# Patient Record
Sex: MALE | Race: WHITE | Employment: OTHER | URBAN - METROPOLITAN AREA
[De-identification: names, ages, dates, MRNs, and addresses within clinical notes are randomized per-mention and may not be internally consistent; named-entity substitution may affect disease eponyms.]

---

## 2017-05-17 ENCOUNTER — APPOINTMENT (OUTPATIENT)
Dept: GENERAL RADIOLOGY | Facility: HOSPITAL | Age: 72
DRG: 189 | End: 2017-05-17
Attending: EMERGENCY MEDICINE
Payer: MEDICARE

## 2017-05-17 ENCOUNTER — HOSPITAL ENCOUNTER (INPATIENT)
Facility: HOSPITAL | Age: 72
LOS: 4 days | Discharge: HOME | DRG: 189 | End: 2017-05-21
Attending: EMERGENCY MEDICINE | Admitting: HOSPITALIST
Payer: MEDICARE

## 2017-05-17 DIAGNOSIS — J44.1 COPD EXACERBATION (HCC): Primary | ICD-10-CM

## 2017-05-17 PROCEDURE — 71010 XR CHEST AP PORTABLE  (CPT=71010): CPT | Performed by: EMERGENCY MEDICINE

## 2017-05-17 PROCEDURE — 99291 CRITICAL CARE FIRST HOUR: CPT | Performed by: INTERNAL MEDICINE

## 2017-05-17 RX ORDER — HYDRALAZINE HYDROCHLORIDE 20 MG/ML
10 INJECTION INTRAMUSCULAR; INTRAVENOUS EVERY 6 HOURS PRN
Status: DISCONTINUED | OUTPATIENT
Start: 2017-05-17 | End: 2017-05-21

## 2017-05-17 RX ORDER — ALBUTEROL SULFATE 2.5 MG/3ML
2.5 SOLUTION RESPIRATORY (INHALATION) CONTINUOUS
Status: DISCONTINUED | OUTPATIENT
Start: 2017-05-17 | End: 2017-05-21

## 2017-05-17 RX ORDER — METHYLPREDNISOLONE SODIUM SUCCINATE 125 MG/2ML
125 INJECTION, POWDER, LYOPHILIZED, FOR SOLUTION INTRAMUSCULAR; INTRAVENOUS ONCE
Status: COMPLETED | OUTPATIENT
Start: 2017-05-17 | End: 2017-05-17

## 2017-05-17 RX ORDER — ALBUTEROL SULFATE 2.5 MG/3ML
SOLUTION RESPIRATORY (INHALATION)
Status: DISCONTINUED
Start: 2017-05-17 | End: 2017-05-17

## 2017-05-17 RX ORDER — LEVOFLOXACIN 5 MG/ML
500 INJECTION, SOLUTION INTRAVENOUS EVERY 24 HOURS
Status: DISCONTINUED | OUTPATIENT
Start: 2017-05-18 | End: 2017-05-18

## 2017-05-17 RX ORDER — METHYLPREDNISOLONE SODIUM SUCCINATE 40 MG/ML
40 INJECTION, POWDER, LYOPHILIZED, FOR SOLUTION INTRAMUSCULAR; INTRAVENOUS EVERY 6 HOURS
Status: DISCONTINUED | OUTPATIENT
Start: 2017-05-18 | End: 2017-05-18

## 2017-05-17 RX ORDER — ACETYLCYSTEINE 200 MG/ML
2 SOLUTION ORAL; RESPIRATORY (INHALATION) ONCE
Status: COMPLETED | OUTPATIENT
Start: 2017-05-17 | End: 2017-05-17

## 2017-05-17 RX ORDER — LEVOFLOXACIN 5 MG/ML
500 INJECTION, SOLUTION INTRAVENOUS ONCE
Status: DISCONTINUED | OUTPATIENT
Start: 2017-05-17 | End: 2017-05-17

## 2017-05-17 RX ORDER — HEPARIN SODIUM 5000 [USP'U]/ML
5000 INJECTION, SOLUTION INTRAVENOUS; SUBCUTANEOUS EVERY 12 HOURS SCHEDULED
Status: DISCONTINUED | OUTPATIENT
Start: 2017-05-17 | End: 2017-05-21

## 2017-05-17 RX ORDER — SODIUM CHLORIDE 9 MG/ML
INJECTION, SOLUTION INTRAVENOUS CONTINUOUS
Status: DISCONTINUED | OUTPATIENT
Start: 2017-05-17 | End: 2017-05-21

## 2017-05-17 RX ORDER — IPRATROPIUM BROMIDE AND ALBUTEROL SULFATE 2.5; .5 MG/3ML; MG/3ML
3 SOLUTION RESPIRATORY (INHALATION) ONCE
Status: COMPLETED | OUTPATIENT
Start: 2017-05-17 | End: 2017-05-17

## 2017-05-17 RX ORDER — ALBUTEROL SULFATE 90 UG/1
2 AEROSOL, METERED RESPIRATORY (INHALATION) EVERY 6 HOURS PRN
COMMUNITY

## 2017-05-17 RX ORDER — IPRATROPIUM BROMIDE AND ALBUTEROL SULFATE 2.5; .5 MG/3ML; MG/3ML
3 SOLUTION RESPIRATORY (INHALATION)
Status: DISCONTINUED | OUTPATIENT
Start: 2017-05-18 | End: 2017-05-18

## 2017-05-18 ENCOUNTER — APPOINTMENT (OUTPATIENT)
Dept: ULTRASOUND IMAGING | Facility: HOSPITAL | Age: 72
DRG: 189 | End: 2017-05-18
Attending: HOSPITALIST
Payer: MEDICARE

## 2017-05-18 PROCEDURE — 99233 SBSQ HOSP IP/OBS HIGH 50: CPT | Performed by: INTERNAL MEDICINE

## 2017-05-18 PROCEDURE — 76705 ECHO EXAM OF ABDOMEN: CPT | Performed by: HOSPITALIST

## 2017-05-18 PROCEDURE — 99233 SBSQ HOSP IP/OBS HIGH 50: CPT | Performed by: HOSPITALIST

## 2017-05-18 RX ORDER — PANTOPRAZOLE SODIUM 40 MG/1
40 TABLET, DELAYED RELEASE ORAL
Status: DISCONTINUED | OUTPATIENT
Start: 2017-05-18 | End: 2017-05-21

## 2017-05-18 RX ORDER — LORAZEPAM 2 MG/ML
1 INJECTION INTRAMUSCULAR
Status: DISCONTINUED | OUTPATIENT
Start: 2017-05-18 | End: 2017-05-19

## 2017-05-18 RX ORDER — IPRATROPIUM BROMIDE AND ALBUTEROL SULFATE 2.5; .5 MG/3ML; MG/3ML
3 SOLUTION RESPIRATORY (INHALATION) EVERY 4 HOURS
Status: DISCONTINUED | OUTPATIENT
Start: 2017-05-18 | End: 2017-05-21

## 2017-05-18 RX ORDER — LEVOFLOXACIN 5 MG/ML
750 INJECTION, SOLUTION INTRAVENOUS EVERY 24 HOURS
Status: DISCONTINUED | OUTPATIENT
Start: 2017-05-18 | End: 2017-05-21

## 2017-05-18 RX ORDER — LORAZEPAM 2 MG/ML
INJECTION INTRAMUSCULAR
Status: COMPLETED
Start: 2017-05-18 | End: 2017-05-18

## 2017-05-18 RX ORDER — LORAZEPAM 2 MG/1
2 TABLET ORAL
Status: DISCONTINUED | OUTPATIENT
Start: 2017-05-18 | End: 2017-05-19

## 2017-05-18 RX ORDER — IPRATROPIUM BROMIDE AND ALBUTEROL SULFATE 2.5; .5 MG/3ML; MG/3ML
3 SOLUTION RESPIRATORY (INHALATION) EVERY 4 HOURS PRN
Status: DISCONTINUED | OUTPATIENT
Start: 2017-05-18 | End: 2017-05-21

## 2017-05-18 RX ORDER — ACETYLCYSTEINE 200 MG/ML
2 SOLUTION ORAL; RESPIRATORY (INHALATION) EVERY 4 HOURS
Status: DISCONTINUED | OUTPATIENT
Start: 2017-05-18 | End: 2017-05-20

## 2017-05-18 RX ORDER — FUROSEMIDE 10 MG/ML
40 INJECTION INTRAMUSCULAR; INTRAVENOUS ONCE
Status: COMPLETED | OUTPATIENT
Start: 2017-05-18 | End: 2017-05-18

## 2017-05-18 RX ORDER — LORAZEPAM 1 MG/1
1 TABLET ORAL
Status: DISCONTINUED | OUTPATIENT
Start: 2017-05-18 | End: 2017-05-19

## 2017-05-18 RX ORDER — NITROGLYCERIN 0.4 MG/1
0.4 TABLET SUBLINGUAL EVERY 5 MIN PRN
Status: DISCONTINUED | OUTPATIENT
Start: 2017-05-18 | End: 2017-05-21

## 2017-05-18 RX ORDER — LOSARTAN POTASSIUM 25 MG/1
12.5 TABLET ORAL DAILY
Status: DISCONTINUED | OUTPATIENT
Start: 2017-05-18 | End: 2017-05-21

## 2017-05-18 RX ORDER — LOSARTAN POTASSIUM 25 MG/1
12.5 TABLET ORAL DAILY
COMMUNITY
End: 2017-05-26

## 2017-05-18 RX ORDER — SODIUM CHLORIDE 9 MG/ML
INJECTION, SOLUTION INTRAVENOUS CONTINUOUS
Status: DISCONTINUED | OUTPATIENT
Start: 2017-05-18 | End: 2017-05-18

## 2017-05-18 RX ORDER — LEVOFLOXACIN 5 MG/ML
750 INJECTION, SOLUTION INTRAVENOUS EVERY 24 HOURS
Status: DISCONTINUED | OUTPATIENT
Start: 2017-05-19 | End: 2017-05-18

## 2017-05-18 RX ORDER — LORAZEPAM 2 MG/ML
0.5 INJECTION INTRAMUSCULAR ONCE
Status: COMPLETED | OUTPATIENT
Start: 2017-05-18 | End: 2017-05-18

## 2017-05-18 RX ORDER — LORAZEPAM 2 MG/ML
2 INJECTION INTRAMUSCULAR
Status: DISCONTINUED | OUTPATIENT
Start: 2017-05-18 | End: 2017-05-19

## 2017-05-18 RX ORDER — HEPARIN SODIUM 5000 [USP'U]/ML
5000 INJECTION, SOLUTION INTRAVENOUS; SUBCUTANEOUS EVERY 12 HOURS SCHEDULED
Status: DISCONTINUED | OUTPATIENT
Start: 2017-05-18 | End: 2017-05-18

## 2017-05-18 RX ORDER — METHYLPREDNISOLONE SODIUM SUCCINATE 40 MG/ML
60 INJECTION, POWDER, LYOPHILIZED, FOR SOLUTION INTRAMUSCULAR; INTRAVENOUS EVERY 8 HOURS
Status: DISCONTINUED | OUTPATIENT
Start: 2017-05-18 | End: 2017-05-20

## 2017-05-18 NOTE — BH PROGRESS NOTE
ADMISSION NOTE    71year old male with O2 Dependent COPD  presents with acute respiratory failure . Available medical records partially reviewed. Dictation to follow.     Osito Boykin M.D.  5/18/2017

## 2017-05-18 NOTE — PROGRESS NOTES
Post midnight follow up     Acute on chronic COPD exacerbation with acute on chronic respiratory failure, likely secondary to viral infection    - continue nebulizers q.4 h. with Mucomyst.     - can't tolerate bipap- oxygen prn  - patient at baseline wears

## 2017-05-18 NOTE — PLAN OF CARE
Problem: Patient/Family Goals  Goal: Patient/Family Long Term Goal  Patient’s Long Term Goal: to go home    Interventions:  -   - See additional Care Plan goals for specific interventions   Outcome: Progressing  Goal: Patient/Family Short Term Goal  Tri cannula. No changes in mentation or behavior. Continue to monitor.      Problem: METABOLIC/FLUID AND ELECTROLYTES - ADULT  Goal: Electrolytes maintained within normal limits  INTERVENTIONS:  - Monitor labs and rhythm and assess patient for signs and symptom

## 2017-05-18 NOTE — CM/SW NOTE
CTL update for progression of care. Patient is a 71year old  male who lives with his wife in [place]. Patient was admitted with exacerbation of COPD, SOB, cough, HTN and ETOH. PT & OT evals ordered for discharge recommendations.   PCP: Dr. Leydi Hicks

## 2017-05-18 NOTE — PROGRESS NOTES
[facility]    Progress Note      Assessment and Plan:   1.  Acute on chronic respiratory failure–the patient has a severe COPD exacerbation with yellow phlegm, dyspnea, chest congestion but with a clear chest x-ray demonstrating only hyperi breath sounds to auscultation and percussion. Cardiac regular rate and rhythm no murmur. Abdomen nontender, without hepatosplenomegaly and no mass appreciable. Extremities without clubbing cyanosis nor edema.    Neurologic grossly intact with symmetric

## 2017-05-18 NOTE — H&P
[facility]    PATIENT'S NAME: Jareth Susanameghna PHYSICIAN: Madhu Mike MD   PATIENT ACCOUNT#:   [id number]    LOCATION:  [address] [place] [address] RECORD #:   [id number]       YOB: 1945  ADMISSION DATE:       05 appetite has been very poor during these past 4 or 5 days and that he has been drinking only water. The only water he drinks is distilled water, and reports that he can drink about a gallon or so in a 24-hour time period.      PAST MEDICAL HISTORY:  COPD a Markedly decreased breath sounds bilaterally with prolonged expiratory phases and wheezes. Some scattered rhonchi as well. He did have some respiratory distress with sitting upright. He said could not lay down in bed, but again could not tolerate BiPAP. basis of drinking a fairly large amount of distilled water, which of course contains no minerals, and not eating. The patient is not on a diuretic as far as I can tell.   I suspect he simply washed out his concentration gradient with his consumption of dis

## 2017-05-18 NOTE — PROGRESS NOTES
[facility]    Progress Note    Arpita Moralez Patient Status:  Inpatient    1945 MRN [id number]   Location [facility] 2W/SW Attending Emma Wagner MD   Hosp Day # 1 PCP Jac Barnes MD     Subjective:     Constitution hyponatremia. Most likely d/t heavy alcohol intake  - sodium this am 117-->119  - renal following  - fluid restriction  - continue IVF and lasix per renal    Hypertension.    - Continue home medications. Sore throat.    - Strep screen was negative.   - l

## 2017-05-18 NOTE — PROGRESS NOTES
PT RECEIVED FROM ED TO ROOM 201 AT THIS TIME FOR SEVERE COPD EXACERBATION. REMAINS IN RESPIRATORY DISTRESS UPON ARRIVAL -- RR 30s W/ ACCESSORY MUSCLE USE, SPO2 >96% ON 6LNC.  RESPIRATORY THERAPIST NOTIFIED FOR INITIATION OF DUONEB TREATMENT PER PT REQUEST (

## 2017-05-18 NOTE — ED INITIAL ASSESSMENT (HPI)
Pt here for SOB. Pt was seen yesterday by doctor to r/o pneumonia. Pt states SOB has been increasing since then. Pt on 4lpm via NC at home. Pt tachypneic, with retractions, labored breathing.

## 2017-05-18 NOTE — CONSULTS
[facility]    Report of Consultation    Mi Page Patient Status:  Inpatient    1945 MRN [id number]   Location [facility] 2W/SW Attending Mundo Sanchez MD   Hosp Day # 1 PCP Gilbert Craft MD     Date of Admission:  5 Q1H PRN   Or      LORazepam (ATIVAN) tab 2 mg 2 mg Oral Q1H PRN   Or      LORazepam (ATIVAN) injection 2 mg 2 mg Intravenous Q1H PRN   INFUVITE (INFUVITE) 10 mL, Thiamine HCl 162 mg, folic acid (FOLVITE) 1 mg in dextrose 5 % 1,000 mL infusion  Intravenous 13.6  13.2   WBC  11.7*  8.3   PLT  164  165         Recent Labs   Lab  05/17/17   1945  05/18/17   0628   GLU  110*  165*   BUN  5*  10   CREATSERUM  0.84  0.90   GFRAA  >60  >60   GFRNAA  >60  >60   CA  8.8  8.8   NA  120*  117*   K  4.3  4.3   CL  83*

## 2017-05-18 NOTE — ED PROVIDER NOTES
Patient Seen in: [facility] Emergency Department    History   Patient presents with:  Dyspnea LEIGHANN SOB (respiratory)    Stated Complaint: SOB since Mnday, on oxygen at home    The history is provided by the patient and a relative.        71year old well-nourished. He is cooperative. Non-toxic appearance. He appears ill. He appears distressed. The patient is in respiratory distress, he is working very hard to breathe sitting forward in a tripod position.    HENT:   Head: Normocephalic and atraumatic Abnormality         Status                     ---------                               -----------         ------                     CBC W/ DIFFERENTIAL[[id number]]          Abnormal            Final result                 Please view results for these pedro albuterol Sulfate (VENTOLIN) (5 MG/ML) 0.5% nebulizer solution 10 mg ( Nebulization Canceled Entry 5/17/17 2118)   albuterol sulfate (VENTOLIN) (2.5 MG/3ML) 0.083% nebulizer solution 2.5 mg (2.5 mg Nebulization New Cont Neb 5/17/17 9389)   levofloxacin i

## 2017-05-18 NOTE — DISCHARGE PLANNING
SANCHEZ following up on d/c planning for the patient. SANCHEZ met with the patient's family at bedside. He lives with his wife and has been independent with his care. He has no assistive devices and has no h/o rehab or HHC. He has 5 stairs at home as well.   PT/O

## 2017-05-18 NOTE — CONSULTS
[facility] HOSP - [facility]    Consult Note    Date:  5/17/2017  Date of Admission:  5/17/2017      Chief Complaint:   Arpita Moralez is a(n) 71year old male with dyspnea.     HPI:   The patient has long-standing history of COPD having smoked 1 pack per da and accommodation. Neck without adenopathy, thyromegaly, JVD nor bruit. Lungs markedly diminished breath sounds to auscultation. Cardiac regular rate and rhythm no murmur. Abdomen nontender, without hepatosplenomegaly and no mass appreciable.    Extr with the names of the medications which he is taking. In the meantime, will use hydralazine IV. I am delighted to assist with Chandana's care.       > 35 min crit care time    Leanna Millan MD  Medical Director, Critical Care, [address] [place] [address]  Medical Director, [place]

## 2017-05-18 NOTE — PROGRESS NOTES
PT INCREASINGLY ANXIOUS/TREMULOUS. PT ADMITS TO DRINKING DAILY - \"COUPLE SHOTS OF WHISKEY, FEW BEERS, AND SOME WINE  DAILY TO TAKE THE EDGE OFF\" - REPORTS LAST DRINK WAS AT ABOUT NOON ON 5/17/17. MD NOTIFIED. TIMBO PROTOCOL INITIATED.

## 2017-05-19 ENCOUNTER — APPOINTMENT (OUTPATIENT)
Dept: GENERAL RADIOLOGY | Facility: HOSPITAL | Age: 72
DRG: 189 | End: 2017-05-19
Attending: INTERNAL MEDICINE
Payer: MEDICARE

## 2017-05-19 PROCEDURE — 99233 SBSQ HOSP IP/OBS HIGH 50: CPT | Performed by: HOSPITALIST

## 2017-05-19 PROCEDURE — 71010 XR CHEST AP PORTABLE  (CPT=71010): CPT | Performed by: INTERNAL MEDICINE

## 2017-05-19 PROCEDURE — 99233 SBSQ HOSP IP/OBS HIGH 50: CPT | Performed by: INTERNAL MEDICINE

## 2017-05-19 PROCEDURE — 90792 PSYCH DIAG EVAL W/MED SRVCS: CPT | Performed by: OTHER

## 2017-05-19 RX ORDER — FUROSEMIDE 10 MG/ML
20 INJECTION INTRAMUSCULAR; INTRAVENOUS ONCE
Status: COMPLETED | OUTPATIENT
Start: 2017-05-19 | End: 2017-05-19

## 2017-05-19 RX ORDER — 0.9 % SODIUM CHLORIDE 0.9 %
VIAL (ML) INJECTION
Status: COMPLETED
Start: 2017-05-19 | End: 2017-05-19

## 2017-05-19 RX ORDER — LORAZEPAM 1 MG/1
1 TABLET ORAL EVERY 2 HOUR PRN
Status: DISCONTINUED | OUTPATIENT
Start: 2017-05-19 | End: 2017-05-20

## 2017-05-19 RX ORDER — MELATONIN
100 DAILY
Status: DISCONTINUED | OUTPATIENT
Start: 2017-05-19 | End: 2017-05-21

## 2017-05-19 RX ORDER — POTASSIUM CHLORIDE 20 MEQ/1
40 TABLET, EXTENDED RELEASE ORAL ONCE
Status: COMPLETED | OUTPATIENT
Start: 2017-05-19 | End: 2017-05-19

## 2017-05-19 RX ORDER — ESCITALOPRAM OXALATE 10 MG/1
5 TABLET ORAL NIGHTLY
Status: DISCONTINUED | OUTPATIENT
Start: 2017-05-19 | End: 2017-05-21

## 2017-05-19 RX ORDER — LORAZEPAM 2 MG/ML
2 INJECTION INTRAMUSCULAR EVERY 2 HOUR PRN
Status: DISCONTINUED | OUTPATIENT
Start: 2017-05-19 | End: 2017-05-20

## 2017-05-19 RX ORDER — FOLIC ACID 1 MG/1
1 TABLET ORAL DAILY
Status: DISCONTINUED | OUTPATIENT
Start: 2017-05-19 | End: 2017-05-21

## 2017-05-19 RX ORDER — CHLORDIAZEPOXIDE HYDROCHLORIDE 5 MG/1
5 CAPSULE, GELATIN COATED ORAL 3 TIMES DAILY
Status: DISCONTINUED | OUTPATIENT
Start: 2017-05-19 | End: 2017-05-20

## 2017-05-19 RX ORDER — LORAZEPAM 2 MG/1
2 TABLET ORAL EVERY 2 HOUR PRN
Status: DISCONTINUED | OUTPATIENT
Start: 2017-05-19 | End: 2017-05-20

## 2017-05-19 RX ORDER — LORAZEPAM 2 MG/ML
1 INJECTION INTRAMUSCULAR EVERY 2 HOUR PRN
Status: DISCONTINUED | OUTPATIENT
Start: 2017-05-19 | End: 2017-05-20

## 2017-05-19 NOTE — PHYSICAL THERAPY NOTE
PHYSICAL THERAPY EVALUATION - INPATIENT     Room Number: 201/201-A  Evaluation Date: 5/19/2017  Type of Evaluation: Initial  Physician Order: PT Eval and Treat    Presenting Problem: COPD exacerbation  Reason for Therapy: Mobility Dysfunction and Disch hypertension    • Hepatitis      HEPATITIS C       Past Surgical History  History reviewed. No pertinent past surgical history.     HOME SITUATION  Type of Home: House   Home Layout: One level  Stairs to Enter : 5  Railing: Yes  Stairs to Bedroom: 0       L blankets)?: A Little   -   Sitting down on and standing up from a chair with arms (e.g., wheelchair, bedside commode, etc.): A Lot   -   Moving from lying on back to sitting on the side of the bed?: A Little   How much help from another person does the pat provided to patient in preparation for discharge.    Goal #5   Current Status    Goal #6    Goal #6  Current Status

## 2017-05-19 NOTE — DISCHARGE PLANNING
SW met with the patient and family at bedside re recommendations for rehab. Family is hopeful that the patient can return home and not go to rehab. SNF list left with them.     Venita Joiner [facility]  [id number]

## 2017-05-19 NOTE — DIETARY NOTE
ADULT NUTRITION INITIAL ASSESSMENT    Pt is at moderate nutrition risk. Pt does not meet malnutrition criteria.       RECOMMENDATIONS TO MD: See Nutrition Intervention      NUTRITION DIAGNOSIS/PROBLEM:  Inadequate oral intake related to decreased ability t intact    FOOD/NUTRITION RELATED HISTORY:  Appetite: Fair and Improved  Intake: 50% ( pt and family reports appetite picking up, po intake better now compared to po intake at home).    Intake Meeting Needs: No   Food Allergies: NKFA  Cultural/Ethnic/Religio

## 2017-05-19 NOTE — PROGRESS NOTES
[facility]    Progress Note    Meg Orf Patient Status:  Inpatient    1945 MRN [id number]   Location [facility] 2W/SW Attending Yelena Gaspar MD   Hosp Day # 2 PCP Aditya Vang MD     Subjective:     Edilo this am 117-->119-->121--> 126  - renal following  - fluid restriction  - continue IVF and lasix per renal    Hypertension.    - Continue home medications. Sore throat.    - Strep screen was negative.   - lozenges ordered    Alcohol dependence with withd at 12:11 by Uvaldo Dangelo, APDIONNA  5/19/2017

## 2017-05-19 NOTE — PROGRESS NOTES
[place] [facility] - [facility]    Progress Note    Libra Abarca Patient Status:  Inpatient    1945 MRN [id number]   Location [facility] 2W/SW Attending Natanael Osborne MD   Hosp Day # 2 PCP Ishmael Ramos MD     Subjective:     Constitution 05/19/2017   CREATSERUM 0.89 05/19/2017   BUN 14 05/19/2017   * 05/19/2017   K 3.8 05/19/2017   CL 84* 05/19/2017   CO2 27 05/19/2017   * 05/19/2017   CA 8.6 05/19/2017   ALB 3.6 05/19/2017   ALKPHO 61 05/19/2017   BILT 1.3* 05/19/2017   TP 7.

## 2017-05-19 NOTE — PLAN OF CARE
CARDIOVASCULAR - ADULT    • Maintains optimal cardiac output and hemodynamic stability Progressing        METABOLIC/FLUID AND ELECTROLYTES - ADULT    • Electrolytes maintained within normal limits Progressing        Patient Centered Care    • Patient prefe

## 2017-05-19 NOTE — PLAN OF CARE
CARDIOVASCULAR - ADULT    • Maintains optimal cardiac output and hemodynamic stability Progressing        METABOLIC/FLUID AND ELECTROLYTES - ADULT    • Electrolytes maintained within normal limits Progressing        Patient/Family Goals    • Patient/Family

## 2017-05-19 NOTE — PLAN OF CARE
Transfer of care turned over per family request. Medications not given at 1200 per family request. Dr Fabrice Church notified of patients shortness of breath and transfer to room 329,.  Report given to Belkis Valencia. Dr Fabrice Church held transfer for Dr Isaías Griffith to see Mili Osorio

## 2017-05-19 NOTE — OCCUPATIONAL THERAPY NOTE
OCCUPATIONAL THERAPY EVALUATION - INPATIENT     Room Number: 201/201-A  Evaluation Date: 5/19/2017  Type of Evaluation: Initial  Presenting Problem:  (Dyspnea, ETOH)    Physician Order: IP Consult to Occupational Therapy  Reason for Therapy: ADL/IADL Dysfu Glo Self. \"    Patient self-stated goal is: no goal verbalized at this time.     OCCUPATIONAL THERAPY EXAMINATION      OBJECTIVE  Agreeable    PAIN ASSESSMENT  No pain reported    ACTIVITY TOLERANCE  O2 Saturation at rest 95% and with activity 92%  O2 Arthur Koyanagi session/findings; All patient questions and concerns addressed; Family present; Other (Comment) (Seated at eob)      OT Goals     Patient will complete functional transfer with supervision  Comment:     Patient will complete toileting with supervision  Commen

## 2017-05-19 NOTE — PROGRESS NOTES
[facility]    Progress Note    Grupo Story Patient Status:  Inpatient    1945 MRN [id number]   Location [facility] 2W/SW Attending Carolyn Smith MD   Hosp Day # 2 PCP Asia Smart MD       Subjective:   Grupo Story CONCLUSION:  1. Hyperinflation. 2. Scarring/atelectasis. 3. Blunted costophrenic angles. 4. Osteoarthritis. 5. Lungs appear more hyperlucent than January 28, 2013.                Idania Guan MD  5/19/2017

## 2017-05-20 PROBLEM — F39 EPISODIC MOOD DISORDER (HCC): Status: ACTIVE | Noted: 2017-05-19

## 2017-05-20 PROBLEM — F10.10 ALCOHOL ABUSE, CONTINUOUS: Status: ACTIVE | Noted: 2017-05-19

## 2017-05-20 PROCEDURE — 99233 SBSQ HOSP IP/OBS HIGH 50: CPT | Performed by: OTHER

## 2017-05-20 PROCEDURE — 99233 SBSQ HOSP IP/OBS HIGH 50: CPT | Performed by: INTERNAL MEDICINE

## 2017-05-20 PROCEDURE — 99233 SBSQ HOSP IP/OBS HIGH 50: CPT | Performed by: HOSPITALIST

## 2017-05-20 RX ORDER — ACETYLCYSTEINE 200 MG/ML
2 SOLUTION ORAL; RESPIRATORY (INHALATION) EVERY 8 HOURS
Status: DISCONTINUED | OUTPATIENT
Start: 2017-05-20 | End: 2017-05-20

## 2017-05-20 RX ORDER — FUROSEMIDE 10 MG/ML
40 INJECTION INTRAMUSCULAR; INTRAVENOUS ONCE
Status: COMPLETED | OUTPATIENT
Start: 2017-05-20 | End: 2017-05-20

## 2017-05-20 RX ORDER — CHLORDIAZEPOXIDE HYDROCHLORIDE 5 MG/1
5 CAPSULE, GELATIN COATED ORAL NIGHTLY
Status: DISCONTINUED | OUTPATIENT
Start: 2017-05-20 | End: 2017-05-21

## 2017-05-20 RX ORDER — LORAZEPAM 2 MG/ML
0.5 INJECTION INTRAMUSCULAR EVERY 4 HOURS PRN
Status: DISCONTINUED | OUTPATIENT
Start: 2017-05-20 | End: 2017-05-21

## 2017-05-20 RX ORDER — METHYLPREDNISOLONE SODIUM SUCCINATE 40 MG/ML
40 INJECTION, POWDER, LYOPHILIZED, FOR SOLUTION INTRAMUSCULAR; INTRAVENOUS EVERY 12 HOURS
Status: DISCONTINUED | OUTPATIENT
Start: 2017-05-21 | End: 2017-05-21

## 2017-05-20 RX ORDER — ACETYLCYSTEINE 200 MG/ML
2 SOLUTION ORAL; RESPIRATORY (INHALATION)
Status: DISCONTINUED | OUTPATIENT
Start: 2017-05-20 | End: 2017-05-21

## 2017-05-20 NOTE — PROGRESS NOTES
Pt. A&Ox4 at bedside. Pt receiving Duoneb treatments along with mucomyst treatments. Noted expiratory wheezing when he first came to the floor. Family at bedside. Saturations at 98 percent.  Educated patient and family at bedside about the purpose of respir

## 2017-05-20 NOTE — PLAN OF CARE
CARDIOVASCULAR - ADULT    • Maintains optimal cardiac output and hemodynamic stability Progressing          METABOLIC/FLUID AND ELECTROLYTES - ADULT    • Electrolytes maintained within normal limits Progressing          Patient Centered Care    • Patient p

## 2017-05-20 NOTE — PROGRESS NOTES
[facility]    Progress Note    Alis Carbajal Patient Status:  Inpatient    1945 MRN [id number]   Location [facility] 3W/SW Attending Zak Moore MD   Hosp Day # 3 PCP Ifeoma Espinoza MD       Subjective:   Alis Carbajal Chest Ap Portable  (cpt=71010)    5/19/2017  CONCLUSION:  1. No acute appearing disease. Hyperinflation compatible with COPD changes. Right perihilar linear scarring.  Calcified pleural plaquing left lower chest.               Alli Tomlin MD  5/20/2017

## 2017-05-20 NOTE — PROGRESS NOTES
[facility]    Progress Note    Arpita Moralez Patient Status:  Inpatient    1945 MRN [id number]   Location [facility] 3W/SW Attending Emma Wagner MD   Hosp Day # 3 PCP Jac Barnes MD       Subjective:   Arpita Moralez costophrenic angles. 4. Osteoarthritis. 5. Lungs appear more hyperlucent than January 28, 2013.               • acetylcysteine  2 mL Nebulization Q8H [address]   • ChlordiazePOXIDE HCl  5 mg Oral Nightly   • Thiamine HCl  100 mg Oral Daily   • folic acid  1 mg Or

## 2017-05-20 NOTE — CONSULTS
[facility] HOSP - [facility]    Report of Consultation    Lance Trujillo Patient Status:  Inpatient    1945 MRN [id number]   Location [facility] 3W/SW Attending Waqas Hand MD   Hosp Day # 3 PCP Danielle Nicole MD     Date of Admission: remember having withdrawal symptom before. Patient exhibited dysphoric affect but denied any homicidal or suicidal ideation. Patient denied any illicit drugs.     Daughter is indicated that patient has a chronic history of anxiety and he used alcohol to d LevoFLOXacin in D5W (LEVAQUIN) IVPB premix 750 mg 750 mg Intravenous Q24H   acetylcysteine (MUCOMYST) 20 % solution 2 mL 2 mL Nebulization Q4H   Losartan Potassium (COZAAR) tab 12.5 mg 12.5 mg Oral Daily   Pantoprazole Sodium (PROTONIX) EC tab 40 mg 40 m (cpt=71010)    5/17/2017  CONCLUSION:  1. Hyperinflation. 2. Scarring/atelectasis. 3. Blunted costophrenic angles. 4. Osteoarthritis. 5. Lungs appear more hyperlucent than January 28, 2013.            Vital Signs:     Blood pressure 108/50, pulse 96, temper Urine, Random Once  Magnesium  CBC With Differential With Platelet  Hepatic Function Panel (7)  Magnesium  Drug Screen 7 W/confirmation, urine Once  Basic Metabolic Panel (8)  CBC With Differential With Platelet  Basic Metabolic Panel (8)  Assay, Thyroid S

## 2017-05-20 NOTE — PROGRESS NOTES
[place] [facility] - [facility]    Progress Note    Arpita Moralez Patient Status:  Inpatient    1945 MRN [id number]   Location [facility] 3W/SW Attending Emma Wagner MD   Hosp Day # 2 PCP Jac Barnes MD       Subjective:   Arpita Moralez and Plan:     COPD exacerbation ([facility] [address]  - causing acute on chronic respiratory failure  - continue duonebs with mucomyst  - continue solumedrol 60 IV q 8 hours   - continue levaquin     Rhinovirus URI  - please see above    Alcohol withdrawal/Delerium Treme

## 2017-05-21 VITALS
SYSTOLIC BLOOD PRESSURE: 143 MMHG | OXYGEN SATURATION: 94 % | RESPIRATION RATE: 20 BRPM | DIASTOLIC BLOOD PRESSURE: 67 MMHG | BODY MASS INDEX: 26.04 KG/M2 | TEMPERATURE: 98 F | WEIGHT: 156.31 LBS | HEIGHT: 65 IN | HEART RATE: 98 BPM

## 2017-05-21 PROCEDURE — 99239 HOSP IP/OBS DSCHRG MGMT >30: CPT | Performed by: HOSPITALIST

## 2017-05-21 PROCEDURE — 99232 SBSQ HOSP IP/OBS MODERATE 35: CPT | Performed by: INTERNAL MEDICINE

## 2017-05-21 RX ORDER — MELATONIN
100 DAILY
Qty: 30 TABLET | Refills: 0 | Status: SHIPPED | OUTPATIENT
Start: 2017-05-21 | End: 2017-05-26

## 2017-05-21 RX ORDER — LEVOFLOXACIN 750 MG/1
750 TABLET ORAL DAILY
Status: DISCONTINUED | OUTPATIENT
Start: 2017-05-22 | End: 2017-05-21

## 2017-05-21 RX ORDER — LEVOFLOXACIN 750 MG/1
750 TABLET ORAL DAILY
Status: DISCONTINUED | OUTPATIENT
Start: 2017-05-21 | End: 2017-05-21

## 2017-05-21 RX ORDER — CHLORDIAZEPOXIDE HYDROCHLORIDE 5 MG/1
5 CAPSULE, GELATIN COATED ORAL NIGHTLY
Qty: 3 CAPSULE | Refills: 0 | Status: SHIPPED | OUTPATIENT
Start: 2017-05-21 | End: 2017-05-26

## 2017-05-21 RX ORDER — POTASSIUM CHLORIDE 20 MEQ/1
40 TABLET, EXTENDED RELEASE ORAL ONCE
Status: COMPLETED | OUTPATIENT
Start: 2017-05-21 | End: 2017-05-21

## 2017-05-21 RX ORDER — ESCITALOPRAM OXALATE 5 MG/1
5 TABLET ORAL NIGHTLY
Qty: 30 TABLET | Refills: 0 | Status: SHIPPED | OUTPATIENT
Start: 2017-05-21 | End: 2017-05-26

## 2017-05-21 RX ORDER — PREDNISONE 10 MG/1
TABLET ORAL
Qty: 30 TABLET | Refills: 0 | Status: ON HOLD | OUTPATIENT
Start: 2017-05-21 | End: 2019-06-11

## 2017-05-21 RX ORDER — FOLIC ACID 1 MG/1
1 TABLET ORAL DAILY
Qty: 30 TABLET | Refills: 0 | Status: SHIPPED | OUTPATIENT
Start: 2017-05-21 | End: 2017-05-26

## 2017-05-21 RX ORDER — PREDNISONE 20 MG/1
40 TABLET ORAL
Status: DISCONTINUED | OUTPATIENT
Start: 2017-05-21 | End: 2017-05-21

## 2017-05-21 RX ORDER — LEVOFLOXACIN 750 MG/1
750 TABLET ORAL DAILY
Qty: 3 TABLET | Refills: 0 | Status: SHIPPED | OUTPATIENT
Start: 2017-05-22 | End: 2017-05-26

## 2017-05-21 RX ORDER — PREDNISONE 20 MG/1
40 TABLET ORAL
Status: DISCONTINUED | OUTPATIENT
Start: 2017-05-22 | End: 2017-05-21

## 2017-05-21 NOTE — PROGRESS NOTES
[facility]    Progress Note    Hernesto Velasquez Patient Status:  Inpatient    1945 MRN [id number]   Location [facility] 3W/SW Attending Cristobal Baxter MD   Hosp Day # 3 PCP Mehdi Adames MD     Subjective:     Constitution 05/20/2017   * 05/20/2017   K 4.4 05/20/2017   K 4.4 05/20/2017   CL 94* 05/20/2017   CO2 28 05/20/2017   * 05/20/2017   CA 8.3* 05/20/2017   ALB 3.1* 05/20/2017   ALKPHO 61 05/19/2017   BILT 1.3* 05/19/2017   TP 7.2 05/19/2017   AST 79* 05/19

## 2017-05-21 NOTE — PLAN OF CARE
CARDIOVASCULAR - ADULT    • Maintains optimal cardiac output and hemodynamic stability Adequate for Discharge        METABOLIC/FLUID AND ELECTROLYTES - ADULT    • Electrolytes maintained within normal limits Adequate for Discharge        Patient Centered C

## 2017-05-21 NOTE — PROGRESS NOTES
Vijay Dutta is a 71year old   with a chronic history of obesity home oxygen and history of daily drinking who presented to the emergency room with severe shortness of breath COPD exacerbation  The patient seen today for over 35 minute, Weekly   escitalopram (LEXAPRO) tablet 5 mg 5 mg Oral Nightly   Atropine Sulfate 0.1 MG/ML injection 0.5 mg 0.5 mg Intravenous PRN   nitroGLYCERIN (NITROSTAT) SL tab 0.4 mg 0.4 mg Sublingual Q5 Min PRN   ipratropium-albuterol (DUONEB) nebulizer solution 3 Pulse: Temp: 98.1 °F (36.7 °C)   Resp: 20       PHYSICAL EXAM:     The patient is alert and oriented ×3. Stated age male sitting in his bed. Patient did not exhibit any psychomotor agitation or retardation and seem more friendly today.   Otherwise th (8)  Emergency MRSA Screen by PCR STAT  Respiratory Panel FLU expanded Once  Emergency MRSA Screen by PCR Once  Sputum culture Once  Rapid Strep A Screen (Lc) STAT    Meds This Visit:    No prescriptions requested or ordered in this encounter         5/20/

## 2017-05-21 NOTE — PLAN OF CARE
Patient discharged to home. Reviewed medications with him and daughters. Discussed taper schedule for prednisone, reviewed side effects for all medications. Discussed follow up appointments and when to call the doctor.  Iv removed, tele removed, id band rem

## 2017-05-21 NOTE — DISCHARGE SUMMARY
[place] [facility] - [facility]    Discharge Summary    Tiera García Patient Status:  Inpatient    1945 MRN [id number]   Location [facility] 3W/SW Attending Tereso Lindo MD   [facility] Day # 4 PCP Myles Anton MD     Date of Admission: 20 steroids  - Monitor daily     Alcohol withdrawal/Delerium [place]  - consulted Dr. Radames Ayala  - started clonidine patch for tachycardia    - other medicaitons per psych- librium qhs, folic acid, lexapro- scripts given       Acute hyponatremia  -secondary to details    ChlordiazePOXIDE HCl 5 MG Caps   Last time this was given:  5 mg on 5/20/2017  8:30 PM   Commonly known as:  LIBRIUM        Take 1 capsule (5 mg total) by mouth nightly.     Quantity:  3 capsule   Refills:  0       escitalopram 5 MG Tabs   Last t Furoate-Vilanterol 200-25 MCG/INH Aepb  - folic acid 1 MG Tabs  - levofloxacin 750 MG Tabs  - Thiamine HCl 100 MG Tabs          Follow up: Follow-up Information     Follow up with Bertin Jennings MD In 1 week.     Specialty:  Internal Medicine

## 2017-05-21 NOTE — PROGRESS NOTES
[facility]    Progress Note    Brittany Leon Patient Status:  Inpatient    1945 MRN [id number]   Location [facility] 3W/SW Attending Salma Arzate MD   Hosp Day # 4 PCP Lynne Marina MD     Subjective:   Subjective: 05/19/2017   DDIMER 0.28 05/17/2017   MG 2.1 05/19/2017   PHOS 1.9* 05/20/2017   TROP 0.02 05/17/2017                         Archie Collins MD  5/21/2017

## 2017-05-23 ENCOUNTER — TELEPHONE (OUTPATIENT)
Dept: MEDSURG UNIT | Facility: HOSPITAL | Age: 72
End: 2017-05-23

## 2017-05-24 ENCOUNTER — TELEPHONE (OUTPATIENT)
Dept: MEDSURG UNIT | Facility: HOSPITAL | Age: 72
End: 2017-05-24

## 2017-05-25 ENCOUNTER — TELEPHONE (OUTPATIENT)
Dept: MEDSURG UNIT | Facility: HOSPITAL | Age: 72
End: 2017-05-25

## 2017-05-26 ENCOUNTER — OFFICE VISIT (OUTPATIENT)
Dept: PULMONOLOGY | Facility: CLINIC | Age: 72
End: 2017-05-26

## 2017-05-26 VITALS
RESPIRATION RATE: 18 BRPM | DIASTOLIC BLOOD PRESSURE: 90 MMHG | OXYGEN SATURATION: 94 % | WEIGHT: 150 LBS | HEIGHT: 66 IN | HEART RATE: 116 BPM | BODY MASS INDEX: 24.11 KG/M2 | SYSTOLIC BLOOD PRESSURE: 143 MMHG

## 2017-05-26 DIAGNOSIS — B37.0 ORAL THRUSH: ICD-10-CM

## 2017-05-26 DIAGNOSIS — J96.11 CHRONIC RESPIRATORY FAILURE WITH HYPOXIA (HCC): ICD-10-CM

## 2017-05-26 DIAGNOSIS — J43.2 CENTRILOBULAR EMPHYSEMA (HCC): Primary | ICD-10-CM

## 2017-05-26 PROCEDURE — 99214 OFFICE O/P EST MOD 30 MIN: CPT | Performed by: INTERNAL MEDICINE

## 2017-05-26 PROCEDURE — G0463 HOSPITAL OUTPT CLINIC VISIT: HCPCS | Performed by: INTERNAL MEDICINE

## 2017-05-26 RX ORDER — ALBUTEROL SULFATE 90 UG/1
2 AEROSOL, METERED RESPIRATORY (INHALATION) EVERY 6 HOURS PRN
Qty: 1 INHALER | Refills: 5 | Status: SHIPPED | OUTPATIENT
Start: 2017-05-26

## 2017-05-26 RX ORDER — LOSARTAN POTASSIUM AND HYDROCHLOROTHIAZIDE 12.5; 5 MG/1; MG/1
TABLET ORAL
Status: ON HOLD | COMMUNITY
End: 2019-06-11

## 2017-05-26 RX ORDER — IPRATROPIUM BROMIDE AND ALBUTEROL SULFATE 2.5; .5 MG/3ML; MG/3ML
SOLUTION RESPIRATORY (INHALATION)
Refills: 3 | COMMUNITY
Start: 2017-05-05

## 2017-05-26 NOTE — PROGRESS NOTES
HPI:    Patient ID: Hernesto Velasquez is a 71year old male.     HPI  Doing well post hospitalization   Almost back to baseline   Some change in voice   No sputum no f/c   No cp   Dyspnea on exertion / poor baseline   No change in weight   No lE edema   Revie Allergies:No Known Allergies   PHYSICAL EXAM:   Physical Exam   Constitutional: He is oriented to person, place, and time. He appears well-nourished. HENT:   Head: Atraumatic. Eyes: EOM are normal.   Neck: Neck supple.    Pulmonary/Chest:   Distant

## 2017-12-18 ENCOUNTER — TELEPHONE (OUTPATIENT)
Dept: PULMONOLOGY | Facility: CLINIC | Age: 72
End: 2017-12-18

## 2017-12-18 RX ORDER — METHYLPREDNISOLONE 4 MG/1
TABLET ORAL
Qty: 1 PACKAGE | Refills: 0 | Status: SHIPPED | OUTPATIENT
Start: 2017-12-18 | End: 2018-07-06 | Stop reason: ALTCHOICE

## 2017-12-18 RX ORDER — AZITHROMYCIN 250 MG/1
TABLET, FILM COATED ORAL
Qty: 1 PACKAGE | Refills: 0 | Status: SHIPPED | OUTPATIENT
Start: 2017-12-18 | End: 2018-03-30 | Stop reason: ALTCHOICE

## 2017-12-18 NOTE — TELEPHONE ENCOUNTER
Pts daughter Trevor Boudreaux states pt has been having more problems with his breathing for last few days and would like to be seen sooner than first available. Please call.

## 2017-12-18 NOTE — TELEPHONE ENCOUNTER
Informed Daughter Dr. Yi Ji instructions below to take [facility] dose pack and zpak. rx sent to the pharmacy. Use breo and neb. Go to ER if worsen. Verbalized understanding.

## 2017-12-18 NOTE — TELEPHONE ENCOUNTER
Per Daughter states pt breathing has worsen in the last couple days. Not on HIPPA. Called pt, verbalized okay to speak to daughter. Per pt has mucus in his chest, able to cough up sputum. Bright yellowish color.  Taking spiriva and neb machine 2 time yester

## 2018-03-09 RX ORDER — TIOTROPIUM BROMIDE 18 UG/1
CAPSULE ORAL; RESPIRATORY (INHALATION)
Qty: 30 CAPSULE | Refills: 5 | Status: SHIPPED | OUTPATIENT
Start: 2018-03-09 | End: 2018-10-11

## 2018-03-26 ENCOUNTER — TELEPHONE (OUTPATIENT)
Dept: PULMONOLOGY | Facility: CLINIC | Age: 73
End: 2018-03-26

## 2018-03-26 NOTE — TELEPHONE ENCOUNTER
Pt c/o increase anxiety (pt states he takes escitalopram 10 mg daily) and SOB for the last 2 weeks, productive cough white sputum (pt states this is his normal and has had this since he was diagnosed with COPD). Pt state is uses oxygen 2-4 L NC and is at 98%. Pt denies fever, chest congestion/tightness, chest pain. Pt requesting appt. Appt made for 3-30-18 4:15 pm.  Pt notified of time date and place of appt. Pt verbalized understanding. Pt informed to also call his PCP to discuss anxiety. Pt states he will call his daughter and this office does not need to call his daughter. Dr. Juan M Valadez.

## 2018-03-26 NOTE — TELEPHONE ENCOUNTER
Pt is having hard time breathing - using O2 regularly -for the last 5 days - asking to be seen this week

## 2018-03-30 ENCOUNTER — OFFICE VISIT (OUTPATIENT)
Dept: PULMONOLOGY | Facility: CLINIC | Age: 73
End: 2018-03-30

## 2018-03-30 VITALS
HEIGHT: 66 IN | WEIGHT: 178.63 LBS | RESPIRATION RATE: 19 BRPM | BODY MASS INDEX: 28.71 KG/M2 | OXYGEN SATURATION: 93 % | DIASTOLIC BLOOD PRESSURE: 78 MMHG | SYSTOLIC BLOOD PRESSURE: 132 MMHG | HEART RATE: 128 BPM

## 2018-03-30 DIAGNOSIS — J96.11 CHRONIC RESPIRATORY FAILURE WITH HYPOXIA (HCC): ICD-10-CM

## 2018-03-30 DIAGNOSIS — J44.9 CHRONIC OBSTRUCTIVE PULMONARY DISEASE, UNSPECIFIED COPD TYPE (HCC): Primary | ICD-10-CM

## 2018-03-30 PROCEDURE — G0463 HOSPITAL OUTPT CLINIC VISIT: HCPCS | Performed by: INTERNAL MEDICINE

## 2018-03-30 PROCEDURE — 99214 OFFICE O/P EST MOD 30 MIN: CPT | Performed by: INTERNAL MEDICINE

## 2018-03-30 RX ORDER — PREDNISONE 10 MG/1
TABLET ORAL
Qty: 40 TABLET | Refills: 0 | Status: ON HOLD | OUTPATIENT
Start: 2018-03-30 | End: 2019-06-11

## 2018-03-30 RX ORDER — PREDNISONE 10 MG/1
TABLET ORAL
Qty: 40 TABLET | Refills: 0 | Status: SHIPPED | OUTPATIENT
Start: 2018-03-30 | End: 2018-03-30

## 2018-03-30 NOTE — PROGRESS NOTES
HPI:    Patient ID: Arpita Moralez is a 68year old male. HPI  ERNANDEZ with minimal activity also with ADL   No colored sputum   Fatigue and weak   No f/c   No cp   Prednisone last time helped   Review of Systems   Constitutional: Positive for fatigue.  Neg days then 40 mg for 2 days then 30 mg for 2 days then 20 mg for 2 days hen 10 mg for 2 days 3 Disp: 30 tablet Rfl: 0     Allergies:No Known Allergies   PHYSICAL EXAM:   Physical Exam   Constitutional: He is oriented to person, place, and time.  He appears w

## 2018-06-04 ENCOUNTER — TELEPHONE (OUTPATIENT)
Dept: PULMONOLOGY | Facility: CLINIC | Age: 73
End: 2018-06-04

## 2018-06-04 NOTE — TELEPHONE ENCOUNTER
AYAKA. Per [facility]/Pulmo rehab. Pt is non compliant with pulmo rehab and pt gave them a hard time with scheduling PFT.  PT is scheduled for a PFT on June 7th at [address] [place] [address].

## 2018-06-19 NOTE — TELEPHONE ENCOUNTER
Current Outpatient Prescriptions:  Fluticasone Furoate-Vilanterol (BREO ELLIPTA) 100-25 MCG/INH Inhalation Aerosol Powder, Breath Activated Inhale 1 puff into the lungs daily.  Disp: 1 each Rfl: 6     Refill

## 2018-07-06 ENCOUNTER — OFFICE VISIT (OUTPATIENT)
Dept: PULMONOLOGY | Facility: CLINIC | Age: 73
End: 2018-07-06

## 2018-07-06 VITALS
DIASTOLIC BLOOD PRESSURE: 94 MMHG | OXYGEN SATURATION: 97 % | HEART RATE: 101 BPM | BODY MASS INDEX: 26.68 KG/M2 | SYSTOLIC BLOOD PRESSURE: 160 MMHG | HEIGHT: 66 IN | WEIGHT: 166 LBS

## 2018-07-06 DIAGNOSIS — J43.1 PANLOBULAR EMPHYSEMA (HCC): Primary | ICD-10-CM

## 2018-07-06 DIAGNOSIS — J96.11 CHRONIC RESPIRATORY FAILURE WITH HYPOXIA AND HYPERCAPNIA (HCC): ICD-10-CM

## 2018-07-06 DIAGNOSIS — J96.12 CHRONIC RESPIRATORY FAILURE WITH HYPOXIA AND HYPERCAPNIA (HCC): ICD-10-CM

## 2018-07-06 PROCEDURE — 99214 OFFICE O/P EST MOD 30 MIN: CPT | Performed by: INTERNAL MEDICINE

## 2018-07-06 PROCEDURE — G0463 HOSPITAL OUTPT CLINIC VISIT: HCPCS | Performed by: INTERNAL MEDICINE

## 2018-07-06 RX ORDER — ESCITALOPRAM OXALATE 10 MG/1
TABLET ORAL
Refills: 11 | Status: ON HOLD | COMMUNITY
Start: 2018-06-03 | End: 2019-06-11

## 2018-07-06 NOTE — PROGRESS NOTES
HPI:    Patient ID: Tania Noel is a 68year old male. HPI  Fatigue and limited activity   Chronic cough / no colored sputum and no hemoptysis   ERNANDEZ with minimal activity   Review of Systems   Constitutional: Positive for fatigue.  Negative for fever Then half tab daily ( 5 mg )  x 10 daysThen stop Disp: 40 tablet Rfl: 0   predniSONE 10 MG Oral Tab Take 3 tab ( 30 mg )  po daily x 3 days Then 2 tab ( 20 mg ) po daily x 3 days Then one tab (10 mg)  daily x 10 days Then half tab daily ( 5 mg )  x 10 days daily.           Imaging & Referrals:  None       #[id number]

## 2018-10-11 NOTE — TELEPHONE ENCOUNTER
Last seen 7-6-18   Last refill 3-9-18   Routed to [address] [place] [address] for sign off.

## 2018-10-12 RX ORDER — TIOTROPIUM BROMIDE 18 UG/1
CAPSULE ORAL; RESPIRATORY (INHALATION)
Qty: 30 CAPSULE | Refills: 5 | Status: SHIPPED | OUTPATIENT
Start: 2018-10-12

## 2019-06-01 ENCOUNTER — HOSPITAL ENCOUNTER (INPATIENT)
Facility: HOSPITAL | Age: 74
LOS: 9 days | Discharge: HOSPICE | DRG: 190 | End: 2019-06-11
Attending: EMERGENCY MEDICINE | Admitting: HOSPITALIST
Payer: MEDICARE

## 2019-06-01 DIAGNOSIS — J44.1 COPD EXACERBATION (HCC): ICD-10-CM

## 2019-06-01 DIAGNOSIS — J18.9 COMMUNITY ACQUIRED PNEUMONIA, UNSPECIFIED LATERALITY: Primary | ICD-10-CM

## 2019-06-02 ENCOUNTER — APPOINTMENT (OUTPATIENT)
Dept: GENERAL RADIOLOGY | Facility: HOSPITAL | Age: 74
DRG: 190 | End: 2019-06-02
Attending: EMERGENCY MEDICINE
Payer: MEDICARE

## 2019-06-02 ENCOUNTER — APPOINTMENT (OUTPATIENT)
Dept: CT IMAGING | Facility: HOSPITAL | Age: 74
DRG: 190 | End: 2019-06-02
Attending: EMERGENCY MEDICINE
Payer: MEDICARE

## 2019-06-02 ENCOUNTER — APPOINTMENT (OUTPATIENT)
Dept: ULTRASOUND IMAGING | Facility: HOSPITAL | Age: 74
DRG: 190 | End: 2019-06-02
Attending: EMERGENCY MEDICINE
Payer: MEDICARE

## 2019-06-02 ENCOUNTER — APPOINTMENT (OUTPATIENT)
Dept: ULTRASOUND IMAGING | Facility: HOSPITAL | Age: 74
DRG: 190 | End: 2019-06-02
Attending: HOSPITALIST
Payer: MEDICARE

## 2019-06-02 PROBLEM — E87.1 HYPONATREMIA: Status: ACTIVE | Noted: 2019-06-02

## 2019-06-02 PROBLEM — J18.9 COMMUNITY ACQUIRED PNEUMONIA, UNSPECIFIED LATERALITY: Status: ACTIVE | Noted: 2019-06-02

## 2019-06-02 PROBLEM — J18.9 COMMUNITY ACQUIRED PNEUMONIA: Status: ACTIVE | Noted: 2019-06-02

## 2019-06-02 PROCEDURE — 93970 EXTREMITY STUDY: CPT | Performed by: EMERGENCY MEDICINE

## 2019-06-02 PROCEDURE — 5A09357 ASSISTANCE WITH RESPIRATORY VENTILATION, LESS THAN 24 CONSECUTIVE HOURS, CONTINUOUS POSITIVE AIRWAY PRESSURE: ICD-10-PCS | Performed by: HOSPITALIST

## 2019-06-02 PROCEDURE — 70450 CT HEAD/BRAIN W/O DYE: CPT | Performed by: EMERGENCY MEDICINE

## 2019-06-02 PROCEDURE — 71101 X-RAY EXAM UNILAT RIBS/CHEST: CPT | Performed by: EMERGENCY MEDICINE

## 2019-06-02 PROCEDURE — 76705 ECHO EXAM OF ABDOMEN: CPT | Performed by: HOSPITALIST

## 2019-06-02 PROCEDURE — 99291 CRITICAL CARE FIRST HOUR: CPT | Performed by: INTERNAL MEDICINE

## 2019-06-02 PROCEDURE — 99223 1ST HOSP IP/OBS HIGH 75: CPT | Performed by: HOSPITALIST

## 2019-06-02 PROCEDURE — 72125 CT NECK SPINE W/O DYE: CPT | Performed by: EMERGENCY MEDICINE

## 2019-06-02 RX ORDER — ALBUTEROL SULFATE 2.5 MG/3ML
2.5 SOLUTION RESPIRATORY (INHALATION) ONCE
Status: COMPLETED | OUTPATIENT
Start: 2019-06-02 | End: 2019-06-02

## 2019-06-02 RX ORDER — IPRATROPIUM BROMIDE AND ALBUTEROL SULFATE 2.5; .5 MG/3ML; MG/3ML
3 SOLUTION RESPIRATORY (INHALATION) ONCE
Status: COMPLETED | OUTPATIENT
Start: 2019-06-02 | End: 2019-06-02

## 2019-06-02 RX ORDER — ESCITALOPRAM OXALATE 10 MG/1
10 TABLET ORAL EVERY MORNING
Status: DISCONTINUED | OUTPATIENT
Start: 2019-06-02 | End: 2019-06-11

## 2019-06-02 RX ORDER — IPRATROPIUM BROMIDE AND ALBUTEROL SULFATE 2.5; .5 MG/3ML; MG/3ML
3 SOLUTION RESPIRATORY (INHALATION) EVERY 4 HOURS PRN
Status: DISCONTINUED | OUTPATIENT
Start: 2019-06-02 | End: 2019-06-02

## 2019-06-02 RX ORDER — LORAZEPAM 1 MG/1
2 TABLET ORAL
Status: DISCONTINUED | OUTPATIENT
Start: 2019-06-02 | End: 2019-06-11

## 2019-06-02 RX ORDER — HYDRALAZINE HYDROCHLORIDE 20 MG/ML
10 INJECTION INTRAMUSCULAR; INTRAVENOUS EVERY 6 HOURS PRN
Status: DISCONTINUED | OUTPATIENT
Start: 2019-06-02 | End: 2019-06-11

## 2019-06-02 RX ORDER — NITROGLYCERIN 0.4 MG/1
0.4 TABLET SUBLINGUAL EVERY 5 MIN PRN
Status: DISCONTINUED | OUTPATIENT
Start: 2019-06-02 | End: 2019-06-11

## 2019-06-02 RX ORDER — LORAZEPAM 2 MG/ML
2 INJECTION INTRAMUSCULAR
Status: DISCONTINUED | OUTPATIENT
Start: 2019-06-02 | End: 2019-06-11

## 2019-06-02 RX ORDER — METHYLPREDNISOLONE SODIUM SUCCINATE 125 MG/2ML
80 INJECTION, POWDER, LYOPHILIZED, FOR SOLUTION INTRAMUSCULAR; INTRAVENOUS EVERY 6 HOURS
Status: DISCONTINUED | OUTPATIENT
Start: 2019-06-02 | End: 2019-06-03

## 2019-06-02 RX ORDER — SODIUM CHLORIDE 450 MG/100ML
INJECTION, SOLUTION INTRAVENOUS CONTINUOUS
Status: DISCONTINUED | OUTPATIENT
Start: 2019-06-02 | End: 2019-06-09

## 2019-06-02 RX ORDER — ONDANSETRON 2 MG/ML
4 INJECTION INTRAMUSCULAR; INTRAVENOUS EVERY 6 HOURS PRN
Status: DISCONTINUED | OUTPATIENT
Start: 2019-06-02 | End: 2019-06-11

## 2019-06-02 RX ORDER — SODIUM CHLORIDE 9 MG/ML
INJECTION, SOLUTION INTRAVENOUS CONTINUOUS
Status: DISCONTINUED | OUTPATIENT
Start: 2019-06-02 | End: 2019-06-02

## 2019-06-02 RX ORDER — SODIUM CHLORIDE 0.9 % (FLUSH) 0.9 %
3 SYRINGE (ML) INJECTION AS NEEDED
Status: DISCONTINUED | OUTPATIENT
Start: 2019-06-02 | End: 2019-06-11

## 2019-06-02 RX ORDER — IPRATROPIUM BROMIDE AND ALBUTEROL SULFATE 2.5; .5 MG/3ML; MG/3ML
3 SOLUTION RESPIRATORY (INHALATION)
Status: DISCONTINUED | OUTPATIENT
Start: 2019-06-02 | End: 2019-06-10

## 2019-06-02 RX ORDER — LORAZEPAM 1 MG/1
1 TABLET ORAL
Status: DISCONTINUED | OUTPATIENT
Start: 2019-06-02 | End: 2019-06-11

## 2019-06-02 RX ORDER — HEPARIN SODIUM 5000 [USP'U]/ML
5000 INJECTION, SOLUTION INTRAVENOUS; SUBCUTANEOUS EVERY 12 HOURS SCHEDULED
Status: DISCONTINUED | OUTPATIENT
Start: 2019-06-02 | End: 2019-06-07

## 2019-06-02 RX ORDER — HYDRALAZINE HYDROCHLORIDE 20 MG/ML
INJECTION INTRAMUSCULAR; INTRAVENOUS
Status: DISPENSED
Start: 2019-06-02 | End: 2019-06-03

## 2019-06-02 RX ORDER — LORAZEPAM 2 MG/ML
1 INJECTION INTRAMUSCULAR
Status: DISCONTINUED | OUTPATIENT
Start: 2019-06-02 | End: 2019-06-11

## 2019-06-02 RX ORDER — METHYLPREDNISOLONE SODIUM SUCCINATE 125 MG/2ML
60 INJECTION, POWDER, LYOPHILIZED, FOR SOLUTION INTRAMUSCULAR; INTRAVENOUS EVERY 6 HOURS
Status: DISCONTINUED | OUTPATIENT
Start: 2019-06-02 | End: 2019-06-02

## 2019-06-02 RX ORDER — DIAZEPAM 5 MG/1
5 TABLET ORAL ONCE
Status: COMPLETED | OUTPATIENT
Start: 2019-06-02 | End: 2019-06-02

## 2019-06-02 RX ORDER — METHYLPREDNISOLONE SODIUM SUCCINATE 125 MG/2ML
125 INJECTION, POWDER, LYOPHILIZED, FOR SOLUTION INTRAMUSCULAR; INTRAVENOUS ONCE
Status: COMPLETED | OUTPATIENT
Start: 2019-06-02 | End: 2019-06-02

## 2019-06-02 NOTE — PROGRESS NOTES
Post mn 30 min spent     Sepsis Reassessment Note     BP 92/60 (BP Location: Left arm)   Pulse (!) 128   Temp 97.7 °F (36.5 °C) (Temporal)   Resp (!) 28   Ht 5' 6\" (1.676 m)   Wt 183 lb 12.8 oz (83.4 kg)   SpO2 98%   BMI 29.67 kg/m²                   11:0

## 2019-06-02 NOTE — PROGRESS NOTES
[facility]      Sepsis Reassessment Note    BP 92/60 (BP Location: Left arm)   Pulse (!) 128   Temp 97.7 °F (36.5 °C) (Temporal)   Resp (!) 28   Ht 5' 6\" (1.676 m)   Wt 183 lb 12.8 oz (83.4 kg)   SpO2 98%   BMI 29.67 kg/m²      11:03 AM

## 2019-06-02 NOTE — RESPIRATORY THERAPY NOTE
Patient placed on Airvo per [place] Doctor order at [phone].  During this time I adjusted settings according to patient comfort due to sats on % on NC 4lpm. Lpm was increased close to 30 and FiO2 89%, as patient was stating to increase air because his carloz

## 2019-06-02 NOTE — ED INITIAL ASSESSMENT (HPI)
EMS called for mechanical fall at appx 2300, pt tripped over oxygen tubing down a few stairs. PT has abrasions/lacs to both elbows, bleeding controlled, dressing applied to left elbow by ems.        At time of triage pt newly complaints of left sided chest

## 2019-06-02 NOTE — ED NOTES
PT refusing c collar at this time. Denies any neck pain, pt suspects possible head injury, no signs of trauma.

## 2019-06-02 NOTE — SEPSIS REASSESSMENT
[facility]    Sepsis Reassessment Note    /61   Pulse (!) 140   Temp 98.5 °F (36.9 °C) (Temporal)   Resp 26   Ht 167.6 cm (5' 6\")   Wt 75.3 kg   SpO2 94%   BMI 26.79 kg/m²      3:33 AM    Cardiac:  Regularity: Regular  Rate: Tachyc

## 2019-06-02 NOTE — PLAN OF CARE
Problem: DISCHARGE PLANNING  Goal: Discharge to home or other facility with appropriate resources  Description  INTERVENTIONS:  - Identify barriers to discharge w/pt and caregiver  - Include patient/family/discharge partner in discharge Geovany Lima broncho-pulmonary hygiene including cough, deep breathe, Incentive Spirometry  - Assess the need for suctioning and perform as needed  - Assess and instruct to report SOB or any respiratory difficulty  - Respiratory Therapy support as indicated  - Manage/a

## 2019-06-02 NOTE — PLAN OF CARE
Problem: Patient Centered Care  Goal: Patient preferences are identified and integrated in the patient's plan of care  Description  Interventions:  - What would you like us to know as we care for you?   - Provide timely, complete, and accurate informatio Progressing     Problem: Altered Communication/Language Barrier  Goal: Patient/Family is able to understand and participate in their care  Description  Interventions:  - Assess communication ability and preferred communication style  - Implement communicat of patient/family/discharge partner  - Complete POLST form as appropriate  - Assess patient's ability to be responsible for managing their own health  - Refer to Case Management Department for coordinating discharge planning if the patient needs post-hospi

## 2019-06-02 NOTE — ED PROVIDER NOTES
Patient Seen in: [facility] AND [facility] Emergency Department    History   Patient presents with:  Fall (musculoskeletal, neurologic)    Stated Complaint: fall    HPI    68 yo male from home who is a poor historian. He tripped and fell going up the stairs.   He Mouth/Throat: Oropharynx is clear and moist.   Eyes: Pupils are equal, round, and reactive to light. Conjunctivae and EOM are normal.   Neck: Normal range of motion. Neck supple.    Cardiovascular: Regular rhythm, normal heart sounds and intact distal pul the following components:    WBC 11.1 (*)     RBC 3.76 (*)     HGB 12.8 (*)     HCT 38.2 (*)     .6 (*)     RDW-SD 56.5 (*)     Neutrophil Absolute Prelim 8.67 (*)     Neutrophil Absolute 8.67 (*)     All other components within normal limits   TROP and wheezing is somewhat improved. Admit to PCU. D/w Dr Jenni Bassett.        I spent a total of 60 minutes of critical care time in obtaining history, performing a physical exam, bedside monitoring of interventions, collecting and interpreting tests and discus

## 2019-06-02 NOTE — H&P
[facility]    PATIENT'S NAME: RUSTAMMARTA MARLOW   ATTENDING PHYSICIAN: Lee Lechuga MD   PATIENT ACCOUNT#:   [id number]    LOCATION:  [address] [place] [address] RECORD #:   [id number]       YOB: 1945  ADMISSION DATE:       06/01/ 4L nasal cannula at home, history of tobacco use disorder in the past with more than 50 pack-year history. Patient apparently quit in 2008.      MEDICATIONS:  Prior to admission included albuterol nebulizer p.r.n., ProAir inhaler every 6 hours as needed fo expiratory wheezes and prolonged expiratory phase. HEART:  Tachycardia, regular rhythm. Normal S1, S2.  ABDOMEN:  Soft, nontender, with normoactive bowel sounds. No guarding. No rebound.   EXTREMITIES:  No clubbing, cyanosis, calf tenderness, or palpabl pneumonia. Continue community-acquired pneumonia treatment as well as IV steroids and nebulizer treatments q.4 h. Will place on AIRVO.   Will try to avoid BiPAP if able, as patient coughed up very thick, sticky secretions in the emergency room, which may

## 2019-06-02 NOTE — CONSULTS
[facility]    Report of Consultation    Para Camilo Patient Status:  Inpatient    1945 MRN [id number]   Location [facility] 2W/SW Attending Rober Lechuga Day # 0 PCP Gilbert Craft MD     Date of Admiss add O2 QPM 8/12, o/n ox 8/12 sao2 85-96%, >2 hrs less than 89%.  min desat events       Past Surgical History  Past Surgical History:   Procedure Laterality Date   • HERNIA SURGERY      hernia repair       Family History  Family History   Problem Relati dextrose 5 % 100 mL ADD-vantage 3.375 g Intravenous Q8H   escitalopram (LEXAPRO) tablet 10 mg 10 mg Oral QAM   Fluticasone Furoate-Vilanterol (BREO ELLIPTA) 200-25 MCG/INH inhaler 1 puff 1 puff Inhalation Daily   Umeclidinium Bromide (INCRUSE ELLIPTA) 62. 5 inhale 2 puff by inhalation route  every 4 - 6 hours as needed       Allergies  No Known Allergies    Review of Systems:    Pertinent items are noted in HPI.     Physical Exam:   Blood pressure 92/60, pulse 107, temperature 97.7 °F (36.5 °C), temperature so matter ischemic change in the periventricular white matter bilaterally. Hypoplastic bilateral frontal sinuses. The examination was read on call by [facility] with a similar interpretation given.    Dictated by (CST): Mikaela Meza MD on 6 patient's wishes for no intubation or mechanical ventilation                                          I spoke to the family in length on multiple occasions today and his CODE STATUS as DO NOT INTUBATE is confirmed    11– DVT prophylaxis with heparin subcu

## 2019-06-02 NOTE — PROGRESS NOTES
Dr Mikaela Booker / pulmonary   Pt seen and examined and order placed and c/w family and staff   Full consult to follow

## 2019-06-03 PROCEDURE — 99233 SBSQ HOSP IP/OBS HIGH 50: CPT | Performed by: HOSPITALIST

## 2019-06-03 PROCEDURE — 99233 SBSQ HOSP IP/OBS HIGH 50: CPT | Performed by: INTERNAL MEDICINE

## 2019-06-03 RX ORDER — FUROSEMIDE 10 MG/ML
20 INJECTION INTRAMUSCULAR; INTRAVENOUS ONCE
Status: COMPLETED | OUTPATIENT
Start: 2019-06-03 | End: 2019-06-03

## 2019-06-03 RX ORDER — MAGNESIUM SULFATE HEPTAHYDRATE 40 MG/ML
2 INJECTION, SOLUTION INTRAVENOUS ONCE
Status: COMPLETED | OUTPATIENT
Start: 2019-06-03 | End: 2019-06-03

## 2019-06-03 RX ORDER — METHYLPREDNISOLONE SODIUM SUCCINATE 125 MG/2ML
60 INJECTION, POWDER, LYOPHILIZED, FOR SOLUTION INTRAMUSCULAR; INTRAVENOUS EVERY 6 HOURS
Status: DISCONTINUED | OUTPATIENT
Start: 2019-06-03 | End: 2019-06-05

## 2019-06-03 NOTE — SLP NOTE
ADULT SWALLOWING EVALUATION    ASSESSMENT    ASSESSMENT/OVERALL IMPRESSION:  Pt seen sitting upright in bed for limited PO trials and evaluation. Pt rec'd on 6L NC upon entrance to room for BSSE.  Pt's family present and verbalized their desire for patient score of 1/7.     RECOMMENDATIONS   Diet Recommendations - Solids: NPO  Diet Recommendations - Liquid: NPO     Medication Administration Recommendations: Non-oral  Treatment Plan/Recommendations: Aspiration precautions  Discharge Recommendations/Plan: Zuly Rainey Coordination: Appears impaired  (Please note: Silent aspiration cannot be evaluated clinically.  Videofluoroscopic Swallow Study is required to rule-out silent aspiration.)    Esophageal Phase of Swallow: No complaints consistent with possible esophageal in

## 2019-06-03 NOTE — CM/SW NOTE
SANCHEZ received MDO for home health assessment. SANCHEZ called and left a message for daughter Jesse Meeks, then followed up with daughter Edi Cuevas. Edi Trammellza reports that Jose Luis Duvall lives in a split level home with his spouse who is in good health.  There are 4 steps to the sec

## 2019-06-03 NOTE — PLAN OF CARE
Problem: Patient/Family Goals  Goal: Patient/Family Long Term Goal  Description  Patient's Long Term Goal:   Interventions:  - pt will be off bipap  - See additional Care Plan goals for specific interventions  Outcome: Progressing  Goal: Patient/Family S as ordered  - Implement neutropenic guidelines  Outcome: Progressing     Problem: SAFETY ADULT - FALL  Goal: Free from fall injury  Description  INTERVENTIONS:  - Assess pt frequently for physical needs  - Identify cognitive and physical deficits and behav Minimize distractions  - Allow time for understanding and response  - Establish method for patient to ask for assistance (call light)  - Provide an  as needed  - Communicate barriers and strategies to overcome with those who interact with patien

## 2019-06-03 NOTE — PROGRESS NOTES
[facility]    Progress Note    Tiera García Patient Status:  Inpatient    1945 MRN [id number]   Location [facility] 2W/SW Attending Wallace Donaldson MD   Hosp Day # 1 PCP Myles Anton MD        Subjective:   Subject Continue home medications, but cautiously, given his possible sepsis. Chronic alcohol use. We will place on     DVT prophylaxis. Subcutaneous heparin. We will also place on SCDs.     DVT: subcutaneous heparin   CODE: partial- no intubation   DISPO: pen Approved by (CST): Stephon Lassiter MD on 6/02/2019 at 7:54          Us Venous Doppler Leg Bilat - Diag Img (cpt=93970)    Result Date: 6/2/2019  CONCLUSION:  1. Normal examination.  2.          A preliminary report was submitted and there is agreement withou

## 2019-06-03 NOTE — PROGRESS NOTES
[facility]    Progress Note    Simeon Martin Patient Status:  Inpatient    1945 MRN [id number]   Location [facility] 2W/SW Attending Mika Escobar MD   Hosp Day # 1 PCP Arti Da Silva MD        Subjective:     Penny Berry 06/03/2019    BUN 13 06/03/2019     (L) 06/03/2019    K 4.7 06/03/2019     06/03/2019    CO2 22.0 06/03/2019     (H) 06/03/2019    CA 7.5 (L) 06/03/2019    ALB 2.1 (L) 06/03/2019    ALKPHO 175 (H) 06/03/2019    BILT 1.4 06/03/2019    TP Left  (cpt=71101)    Result Date: 6/2/2019  CONCLUSION:  1. No acute disease. 2. Demineralization. 3. Hyperinflation. 4. Scarring/atelectasis. 5. Pleural plaquing. 6. A preliminary report was submitted and there are no major discrepancies.     Dictated by (

## 2019-06-03 NOTE — PLAN OF CARE
Problem: Patient/Family Goals  Goal: Patient/Family Long Term Goal  Description  Patient's Long Term Goal:     Interventions:  -   - See additional Care Plan goals for specific interventions  Outcome: Progressing  Goal: Patient/Family Short Term Goal  Desiree Cervantes neutropenic guidelines  Outcome: Progressing-antibiotics as prescribed,afebrile     Problem: SAFETY ADULT - FALL  Goal: Free from fall injury  Description  INTERVENTIONS:  - Assess pt frequently for physical needs  - Identify cognitive and physical deficit Patient/Family is able to understand and participate in their care  Description  Interventions:  - Assess communication ability and preferred communication style  - Implement communication aides and strategies  - Use visual cues when possible  - Listen att

## 2019-06-04 ENCOUNTER — APPOINTMENT (OUTPATIENT)
Dept: GENERAL RADIOLOGY | Facility: HOSPITAL | Age: 74
DRG: 190 | End: 2019-06-04
Attending: INTERNAL MEDICINE
Payer: MEDICARE

## 2019-06-04 ENCOUNTER — APPOINTMENT (OUTPATIENT)
Dept: PICC SERVICES | Facility: HOSPITAL | Age: 74
DRG: 190 | End: 2019-06-04
Attending: NURSE PRACTITIONER
Payer: MEDICARE

## 2019-06-04 PROCEDURE — 99233 SBSQ HOSP IP/OBS HIGH 50: CPT | Performed by: INTERNAL MEDICINE

## 2019-06-04 PROCEDURE — 99233 SBSQ HOSP IP/OBS HIGH 50: CPT | Performed by: HOSPITALIST

## 2019-06-04 PROCEDURE — 71045 X-RAY EXAM CHEST 1 VIEW: CPT | Performed by: INTERNAL MEDICINE

## 2019-06-04 PROCEDURE — 02HV33Z INSERTION OF INFUSION DEVICE INTO SUPERIOR VENA CAVA, PERCUTANEOUS APPROACH: ICD-10-PCS | Performed by: HOSPITALIST

## 2019-06-04 RX ORDER — FUROSEMIDE 10 MG/ML
20 INJECTION INTRAMUSCULAR; INTRAVENOUS ONCE
Status: COMPLETED | OUTPATIENT
Start: 2019-06-04 | End: 2019-06-04

## 2019-06-04 RX ORDER — QUETIAPINE 25 MG/1
25 TABLET, FILM COATED ORAL NIGHTLY
Status: DISCONTINUED | OUTPATIENT
Start: 2019-06-04 | End: 2019-06-05

## 2019-06-04 RX ORDER — LIDOCAINE HYDROCHLORIDE 10 MG/ML
0.5 INJECTION, SOLUTION INFILTRATION; PERINEURAL ONCE AS NEEDED
Status: ACTIVE | OUTPATIENT
Start: 2019-06-04 | End: 2019-06-04

## 2019-06-04 RX ORDER — SODIUM CHLORIDE 0.9 % (FLUSH) 0.9 %
10 SYRINGE (ML) INJECTION AS NEEDED
Status: DISCONTINUED | OUTPATIENT
Start: 2019-06-04 | End: 2019-06-11

## 2019-06-04 RX ORDER — MELATONIN
100 DAILY
Status: DISCONTINUED | OUTPATIENT
Start: 2019-06-04 | End: 2019-06-11

## 2019-06-04 RX ORDER — DIAZEPAM 5 MG/1
5 TABLET ORAL 3 TIMES DAILY
Status: DISCONTINUED | OUTPATIENT
Start: 2019-06-04 | End: 2019-06-06

## 2019-06-04 NOTE — SLP NOTE
ADULT SWALLOWING RE-EVALUATION    ASSESSMENT    ASSESSMENT/OVERALL IMPRESSION:          PMH of dysphagia:  BSE 6/03/19 recommended NPO status with partial assessment completed d/t increased respiratory rate noted.  Spouse present during this exam.       Pt dysphagia treatment. Focus will be to ensure safe tolerance of diet and to reinforce all swallowing precautions/strategies to improve safety/efficiency of the swallow. Diet to be upgraded as appropriate. Will monitor for any new CXR results.  Will complete Functional  Symmetry: Within Functional Limits  Strength: Reduced right facial;Reduced right lingual;Reduced left facial;Reduced left lingual  Tone: Within Functional Limits  Range of Motion: Reduced right facial;Reduced left facial;Reduced right lingual;R Plan/Recommendations: Dysphagia therapy; Aspiration precautions  Number of Visits to Meet Established Goals: 3  Follow Up Needed: Yes  SLP Follow-up Date: 06/05/19    Thank you for your referral.   If you have any questions, please contact       Antonia Benoit

## 2019-06-04 NOTE — PROGRESS NOTES
[facility] [facility]    Progress Note    Trinity Cassidy Patient Status:  Inpatient    1945 MRN [id number]   Location [facility] 2W/SW Attending Timothy Casper, [address] [facility] Day # 2 PCP Stef Braun MD        Subjective:     Geena Sosa CREATSERUM 0.84 06/04/2019    BUN 16 06/04/2019     06/04/2019    K 4.6 06/04/2019     06/04/2019    CO2 28.0 06/04/2019     (H) 06/04/2019    CA 7.7 (L) 06/04/2019    ALB 2.2 (L) 06/04/2019    ALKPHO 138 (H) 06/04/2019    BILT 1.1 06

## 2019-06-04 NOTE — BH PROGRESS NOTE
Behavioral Health Note  CHIEF COMPLAINT  Fall and shortness of breath    REASON FOR ADMISSION  Fall and shortness of breath    SOCIAL HISTORY  Lazarus Luster lives with his wife and is a retired . He does not smoke or use drugs, but drinks daily.     PAST PSY days, plus diluted wine, plus whiskey that he hides from his wife. Family admits to purchasing alcohol for him and report that he becomes very angry when they refuse.   Family reports a h/o withdrawal during his last hospitalization as well and express conc

## 2019-06-04 NOTE — PROGRESS NOTES
[facility]    Progress Note    Cele Part Patient Status:  Inpatient    1945 MRN [id number]   Location [facility] 2W/SW Attending Wilmer Diego MD   Hosp Day # 2 PCP Dharmesh Melton MD        Subjective:   Subject Elevated liver function tests. May be on the basis of his chronic hepatitis C. Possible acute alcoholic hepatitis  - check ultrasound of the liver to rule out any biliary obstruction. - limited exam, fatty liver  - monitor   - improving     Hypertensio

## 2019-06-04 NOTE — PLAN OF CARE
Patient has been on bipap overnight at 40%. Patient insisted on taking off mask last night and trying 5LHF nasal cannula. Patient maintained O2 sats above 95, but became very tachypneic and tachycardia and had labored breathing. Patient was very agitated. measures as appropriate and evaluate response  - Consider cultural and social influences on pain and pain management  - Manage/alleviate anxiety  - Utilize distraction and/or relaxation techniques  - Monitor for opioid side effects  - Notify MD/LIP if inte Department for coordinating discharge planning if the patient needs post-hospital services based on physician/LIP order or complex needs related to functional status, cognitive ability or social support system  Outcome: Progressing     Problem: Altered Com

## 2019-06-05 PROBLEM — F10.231 ALCOHOL WITHDRAWAL, WITH DELIRIUM (HCC): Status: ACTIVE | Noted: 2019-06-05

## 2019-06-05 PROBLEM — F10.931 ALCOHOL WITHDRAWAL, WITH DELIRIUM (HCC): Status: ACTIVE | Noted: 2019-06-05

## 2019-06-05 PROCEDURE — 90792 PSYCH DIAG EVAL W/MED SRVCS: CPT | Performed by: OTHER

## 2019-06-05 PROCEDURE — 99233 SBSQ HOSP IP/OBS HIGH 50: CPT | Performed by: HOSPITALIST

## 2019-06-05 PROCEDURE — 99233 SBSQ HOSP IP/OBS HIGH 50: CPT | Performed by: INTERNAL MEDICINE

## 2019-06-05 RX ORDER — DILTIAZEM HYDROCHLORIDE 60 MG/1
60 TABLET, FILM COATED ORAL AS NEEDED
Status: DISCONTINUED | OUTPATIENT
Start: 2019-06-05 | End: 2019-06-10

## 2019-06-05 RX ORDER — METHYLPREDNISOLONE SODIUM SUCCINATE 40 MG/ML
40 INJECTION, POWDER, LYOPHILIZED, FOR SOLUTION INTRAMUSCULAR; INTRAVENOUS EVERY 6 HOURS
Status: DISCONTINUED | OUTPATIENT
Start: 2019-06-05 | End: 2019-06-08

## 2019-06-05 NOTE — PLAN OF CARE
Pt is alert and oriented to self and place. Follows commands. Impulsive, poor judgment with continued attempts to get out of bed and pull at Bi-PAP mask without success. Intermittent use of ativan IVP depending on CIWA score.  NSR to ST with frequent PVC's pain management  - Manage/alleviate anxiety  - Utilize distraction and/or relaxation techniques  - Monitor for opioid side effects  - Notify MD/LIP if interventions unsuccessful or patient reports new pain  - Anticipate increased pain with activity and pre physician/LIP order or complex needs related to functional status, cognitive ability or social support system  Outcome: Progressing     Problem: Altered Communication/Language Barrier  Goal: Patient/Family is able to understand and participate in their car

## 2019-06-05 NOTE — CONSULTS
[facility]    Report of Consultation    Fawad Atwood Patient Status:  Inpatient    1945 MRN [id number]   Location [facility] 2W/SW Attending Jay Peralta, [address] [facility] Day # 3 PCP Minda Lowe MD     Date of Admission May 2017 which seemed to increase to 10 mg which patient currently has been on. The patient with no suicidal ideation or verbalizing any direct safety concern from family standpoint.   Otherwise demonstrating some hopelessness and not taking care of hims Flush 0.9 % injection 3 mL 3 mL Intravenous PRN   Atropine Sulfate 0.1 MG/ML injection 0.5 mg 0.5 mg Intravenous PRN   nitroGLYCERIN (NITROSTAT) SL tab 0.4 mg 0.4 mg Sublingual Q5 Min PRN   Heparin Sodium (Porcine) 5000 UNIT/ML injection 5,000 Units 5,000 mg)  daily x 10 days Then half tab daily ( 5 mg )  x 10 daysThen stop   predniSONE 10 MG Oral Tab Take 3 tab ( 30 mg )  po daily x 3 days Then 2 tab ( 20 mg ) po daily x 3 days Then one tab (10 mg)  daily x 10 days Then half tab daily ( 5 mg )  x 10 daysTh Hyperinflation. 3. Scarring/atelectasis. 4. Blunted right costophrenic angle. 5. Osteoarthritis. Dictated by (CST): Lidia Willson MD on 6/04/2019 at 10:51     Approved by (CST):  Lidia Willson MD on 6/04/2019 at 10:55            Vital Signs: Platelet      Basic Metabolic Panel (8)      Troponin I      Urinalysis with Culture Reflex Once      Ethyl Alcohol      Hepatic Function Panel (7)      Lactic Acid, Plasma      Lactic Acid 3 Hr Post Positive      Comp Metabolic Panel (14)      Magnesium

## 2019-06-05 NOTE — PROGRESS NOTES
[facility]    Progress Note    Vadim Ohs Patient Status:  Inpatient    1945 MRN [id number]   Location [facility] 2W/SW Attending Germán Harper, [address] [facility] Day # 3 PCP Jono Dennis MD        Subjective:     Graciella Quiet 06/05/2019    CA 8.3 (L) 06/05/2019    ALB 2.3 (L) 06/05/2019    ALKPHO 124 (H) 06/05/2019    BILT 1.1 06/05/2019    TP 6.1 (L) 06/05/2019    AST 71 (H) 06/05/2019    ALT 94 (H) 06/05/2019    TSH 2.20 05/19/2017    DDIMER 0.28 05/17/2017    MG 2.1 06/04/20

## 2019-06-05 NOTE — CONSULTS
[facility]    Report of Consultation    Josh Guerin Patient Status:  Inpatient    1945 MRN [id number]   Location [facility] 2W/SW Attending Lakshmi Gonzalez, [address] [facility] Day # 3 PCP Vinicius Dunn MD     Date of Admission: admission.   EKG was sinus tachycardia low voltage and right atrial enlargement    Past Medical History        Past Medical History:   Diagnosis Date   • COPD (chronic obstructive pulmonary disease) ([facility] [address]    • Essential hypertension    • Hepatitis     HEPAT 2 mg 2 mg Oral Q1H PRN   Or      LORazepam (ATIVAN) injection 2 mg 2 mg Intravenous Q1H PRN   ondansetron HCl (ZOFRAN) injection 4 mg 4 mg Intravenous Q6H PRN   INFUVITE ADULT (INFUVITE) 10 mL, Thiamine HCl 898 mg, folic acid (FOLVITE) 1 mg in dextrose 5 % mg for 2 days 3   Albuterol Sulfate HFA (PROAIR HFA) 108 (90 Base) MCG/ACT Inhalation Aero Soln Inhale 2 puffs into the lungs every 6 (six) hours as needed for Wheezing or Shortness of Breath.    Fluticasone Furoate-Vilanterol (BREO ELLIPTA) 200-25 MCG/INH Physical Exam:           General: Sleepy but appears comfortable and unable to answer questions  HEENT:  Normocephalic, anicteric sclera, neck supple, no thyromegaly or adenopathy. Neck:  No JVD, carotids 2+, no bruits.   Cardiac:  Regular rate and r

## 2019-06-05 NOTE — PROGRESS NOTES
[facility]    Progress Note    Para Camilo Patient Status:  Inpatient    1945 MRN [id number]   Location [facility] 2W/SW Attending Tabatha Zaman MD   Hosp Day # 3 PCP Gilbert Craft MD        Subjective:   Subject with rates up to 130's  No known previous history   - consult to [place] [facility]   - start on diltiazem drip     Elevated liver function tests. May be on the basis of his chronic hepatitis C.  Possible acute alcoholic hepatitis  - check ultrasound of the judd No AC at this time. Discussed with wife and daughter. Discussed with Dr Celine Giles. >35 min spent with patient. > 50% time was spent counseling patient, discussing plan of care, discussing labs and imaging findings. Spoke with consultant.  All questions an

## 2019-06-06 ENCOUNTER — APPOINTMENT (OUTPATIENT)
Dept: CV DIAGNOSTICS | Facility: HOSPITAL | Age: 74
DRG: 190 | End: 2019-06-06
Attending: INTERNAL MEDICINE
Payer: MEDICARE

## 2019-06-06 PROCEDURE — 93306 TTE W/DOPPLER COMPLETE: CPT | Performed by: INTERNAL MEDICINE

## 2019-06-06 PROCEDURE — 99233 SBSQ HOSP IP/OBS HIGH 50: CPT | Performed by: OTHER

## 2019-06-06 PROCEDURE — 99233 SBSQ HOSP IP/OBS HIGH 50: CPT | Performed by: INTERNAL MEDICINE

## 2019-06-06 PROCEDURE — 99233 SBSQ HOSP IP/OBS HIGH 50: CPT | Performed by: HOSPITALIST

## 2019-06-06 RX ORDER — DIAZEPAM 2 MG/1
2 TABLET ORAL 3 TIMES DAILY
Status: DISCONTINUED | OUTPATIENT
Start: 2019-06-06 | End: 2019-06-07

## 2019-06-06 RX ORDER — QUETIAPINE 25 MG/1
25 TABLET, FILM COATED ORAL NIGHTLY
Status: DISCONTINUED | OUTPATIENT
Start: 2019-06-06 | End: 2019-06-10

## 2019-06-06 RX ORDER — FUROSEMIDE 10 MG/ML
20 INJECTION INTRAMUSCULAR; INTRAVENOUS ONCE
Status: COMPLETED | OUTPATIENT
Start: 2019-06-06 | End: 2019-06-06

## 2019-06-06 NOTE — SPIRITUAL CARE NOTE
Per family request, anointing request for PT has been entered for next visiting [place] [phone] . Thank you, GG

## 2019-06-06 NOTE — PROGRESS NOTES
[place] [facility] - [facility]    Progress Note    Henry Yoon Patient Status:  Inpatient    1945 MRN [id number]   Location [facility] 2W/SW Attending Alexsander Bustamante, [address] [facility] Day # 4 PCP Migdalia Ortega MD        Subjective:     Lawerance Brojarod today   Continue supportive care / in 1-2 days to consider palliative care if no improvement                           Results:     Lab Results   Component Value Date    WBC 18.2 (H) 06/06/2019    HGB 11.4 (L) 06/06/2019    HCT 34.3 (L) 06/06/2019    PLT 1

## 2019-06-06 NOTE — PROGRESS NOTES
Mi Page is a 69year old   male with a chronic history of COPD on daily oxygen and chronic history of alcoholism with daily drinking who presented to the hospital after recent fall and found with pneumonia.   Alcohol blood concentrat No       Medications (Active prior to today's visit):    Current Facility-Administered Medications:  QUEtiapine Fumarate (SEROQUEL) tab 25 mg 25 mg Oral Nightly   diazepam (VALIUM) tab 2 mg 2 mg Oral TID   methylPREDNISolone Sodium Succ (Solu-MEDROL) injec mg in Sodium Chloride 0.9 % 10 mL IV push 40 mg Intravenous Daily   hydrALAzine HCl (APRESOLINE) injection 10 mg 10 mg Intravenous Q6H PRN   0.45% NaCl infusion  Intravenous Continuous       Allergies:  No Known Allergies    Laboratory Data:  Lab Results dependency, continuous     Discussed risk and benefit, acknowledging the current symptom and severity.   The patient with a chronic history of alcohol dependency and multiple medical condition who presented to hospital with recent fall, respiratory failure

## 2019-06-06 NOTE — PROGRESS NOTES
[facility]    Progress Note    Leora Lew Patient Status:  Inpatient    1945 MRN [id number]   Location [facility] 2W/SW Attending Fanny Hartmann MD   Hosp Day # 4 PCP Duke Chester MD        Subjective:   Subject up to 130's  No known previous history   - consult to [facility]   - start on diltiazem drip- po if able     Elevated liver function tests. May be on the basis of his chronic hepatitis C.  Possible acute alcoholic hepatitis  - check ultrasound of the li

## 2019-06-06 NOTE — PROGRESS NOTES
[facility] HOSP - [facility]    Cardiology Progress Note    Fred Downing Patient Status:  Inpatient    1945 MRN [id number]   [facility] 2W/SW Attending Neal Jiménez, [address] [facility] Day # 4 PCP Pranay Biggs MD     [id number]  Subject 06/05/19  0410   * 126* 103* 94*   * 145* 91* 71*   ALB 2.5* 2.1* 2.2* 2.3*       Recent Labs   Lab 06/02/19  0017   TROP <0.045       Allergies:   No Known Allergies    Medications:    Current Facility-Administered Medications:  methylPREDNI MCG/INH inhaler 1 puff 1 puff Inhalation Daily   Pantoprazole Sodium (PROTONIX) 40 mg in Sodium Chloride 0.9 % 10 mL IV push 40 mg Intravenous Daily   hydrALAzine HCl (APRESOLINE) injection 10 mg 10 mg Intravenous Q6H PRN   0.45% NaCl infusion  Intravenous

## 2019-06-06 NOTE — SLP NOTE
SPEECH DAILY NOTE - INPATIENT    ASSESSMENT & PLAN   ASSESSMENT      Pt alert, afebrile and on airvo Fi02 30%. Pt seen for swallow analysis per BSE recommendations (after consulting with RN).  Pt observed upright in bed with current diet of pureed/nectar-th straw  Medication Administration Recommendations: One pill at a time;Crushed in puree    Patient Experiencing Pain: No                Discharge Recommendations  Discharge Recommendations/Plan: Undetermined    Treatment Plan  Treatment Plan/Recommendations: session(s). Pt now on airvo and no trial upgrade deemed appropriate at this time.      Goal not met       FOLLOW UP  Follow Up Needed: Yes  SLP Follow-up Date: 06/07/19  Number of Visits to Meet Established Goals: 3    Session: 1    If you have any quest

## 2019-06-07 PROCEDURE — 99233 SBSQ HOSP IP/OBS HIGH 50: CPT | Performed by: HOSPITALIST

## 2019-06-07 PROCEDURE — 99233 SBSQ HOSP IP/OBS HIGH 50: CPT | Performed by: INTERNAL MEDICINE

## 2019-06-07 PROCEDURE — 99233 SBSQ HOSP IP/OBS HIGH 50: CPT | Performed by: OTHER

## 2019-06-07 RX ORDER — LORAZEPAM 1 MG/1
1 TABLET ORAL 3 TIMES DAILY
Status: DISCONTINUED | OUTPATIENT
Start: 2019-06-07 | End: 2019-06-08

## 2019-06-07 NOTE — DIETARY NOTE
ADULT NUTRITION INITIAL ASSESSMENT    Pt is at moderate nutrition risk. Pt does not meet malnutrition criteria. RECOMMENDATIONS TO MD:  See Nutrition Intervention and See Calorie count info section for details.      NUTRITION DIAGNOSIS/PROBLEM:  Abelardo Mcfadden nutrition care to new setting or provider:  monitor plans    PERTINENT PAST MEDICAL HISTORY:  has a past medical history of COPD (chronic obstructive pulmonary disease) ([facility] [address] Essential hypertension, Hepatitis, and Hypoxemia. ANTHROPOMETRICS:  HT: 167. 40 mg Intravenous Daily     LABS: reviewed  Liver enzymes elevated  likely r/t Chronic hep C or possible Alcoholic hepatitis.    Recent Labs     06/04/19  0430 06/05/19  0410 06/06/19  0502 06/07/19  0438   * 159* 168* 192*   BUN 16 22* 23* 22*   CRE

## 2019-06-07 NOTE — PROGRESS NOTES
[facility] HOSP - [facility]    Cardiology Progress Note    Art Lazar Patient Status:  Inpatient    1945 MRN [id number]   [facility] 2W/SW Attending Lizzy Otto, [address] [facility] Day # 5 PCP Mariama Patricio MD     [id number]  Subject 06/02/19  0017 06/03/19  0438 06/04/19  0430 06/05/19  0410   * 126* 103* 94*   * 145* 91* 71*   ALB 2.5* 2.1* 2.2* 2.3*       Recent Labs   Lab 06/02/19 0017   TROP <0.045       Allergies:   No Known Allergies    Medications:    Current Fac ELLIPTA) 200-25 MCG/INH inhaler 1 puff 1 puff Inhalation Daily   Umeclidinium Bromide (INCRUSE ELLIPTA) 62.5 MCG/INH inhaler 1 puff 1 puff Inhalation Daily   Pantoprazole Sodium (PROTONIX) 40 mg in Sodium Chloride 0.9 % 10 mL IV push 40 mg Intravenous Maurice

## 2019-06-07 NOTE — PROGRESS NOTES
[facility]    Progress Note    Sheridan Horowitz Patient Status:  Inpatient    1945 MRN [id number]   Location [facility] 2W/SW Attending Zander Ahmadi MD   Hosp Day # 5 PCP Shelley Rangel MD        Subjective:   Subject [place] heart   - start on diltiazem drip- po if able     Elevated liver function tests. May be on the basis of his chronic hepatitis C. Possible acute alcoholic hepatitis  - check ultrasound of the liver to rule out any biliary obstruction. - limited exam

## 2019-06-07 NOTE — PLAN OF CARE
Problem: Patient/Family Goals  Goal: Patient/Family Long Term Goal  Description  Patient's Long Term Goal:     Interventions:  -  - See additional Care Plan goals for specific interventions  Outcome: Progressing  Goal: Patient/Family Short Term Goal  [place] ordered  - Implement neutropenic guidelines  Outcome: Progressing     Problem: SAFETY ADULT - FALL  Goal: Free from fall injury  Description  INTERVENTIONS:  - Assess pt frequently for physical needs  - Identify cognitive and physical deficits and behavior Minimize distractions  - Allow time for understanding and response  - Establish method for patient to ask for assistance (call light)  - Provide an  as needed  - Communicate barriers and strategies to overcome with those who interact with patien

## 2019-06-07 NOTE — PROGRESS NOTES
[facility]OS[facility] - [facility]    Progress Note    Laura Course Patient Status:  Inpatient    1945 MRN [id number]   Location [facility] 2W/SW Attending Bella Rodriguez, [address] [facility] Day # 5 PCP Ian Castillo MD        Subjective:     Constit WBC 14.4 (H) 06/07/2019    HGB 9.8 (L) 06/07/2019    HCT 29.5 (L) 06/07/2019    .0 (L) 06/07/2019    CREATSERUM 0.95 06/07/2019    BUN 22 (H) 06/07/2019     06/07/2019    K 3.9 06/07/2019     06/07/2019    CO2 32.0 06/07/2019

## 2019-06-07 NOTE — PLAN OF CARE
Problem: Patient Centered Care  Goal: Patient preferences are identified and integrated in the patient's plan of care  Description  Interventions:  - What would you like us to know as we care for you? \"Stop bothering me! \"  - Provide timely, complete, a

## 2019-06-07 NOTE — PROGRESS NOTES
Satnam Gamboa is a 69year old   male with a chronic history of COPD on daily oxygen and chronic history of alcoholism with daily drinking who presented to the hospital after recent fall and found with pneumonia.   Alcohol blood concentrat Use      Smoking status: Former Smoker        Packs/day: 2.00        Years: 50.00        Pack years: 100        Types: Cigarettes        Quit date: 2008        Years since quittin.4      Smokeless tobacco: Never Used    Alcohol use: No      Alcoho 200-25 MCG/INH inhaler 1 puff 1 puff Inhalation Daily   Umeclidinium Bromide (INCRUSE ELLIPTA) 62.5 MCG/INH inhaler 1 puff 1 puff Inhalation Daily   Pantoprazole Sodium (PROTONIX) 40 mg in Sodium Chloride 0.9 % 10 mL IV push 40 mg Intravenous Daily   hydrA history of alcohol dependency and multiple medical condition who presented to hospital with recent fall, respiratory failure and pneumonia demonstrating symptom of withdrawal.  At this point, I would recommend the following approach:      1.   Focus on educ prescriptions requested or ordered in this encounter         6/7/2019  Lavon Méndez MD

## 2019-06-08 PROCEDURE — 99233 SBSQ HOSP IP/OBS HIGH 50: CPT | Performed by: INTERNAL MEDICINE

## 2019-06-08 PROCEDURE — 99233 SBSQ HOSP IP/OBS HIGH 50: CPT | Performed by: HOSPITALIST

## 2019-06-08 RX ORDER — PANTOPRAZOLE SODIUM 40 MG/1
40 TABLET, DELAYED RELEASE ORAL
Status: DISCONTINUED | OUTPATIENT
Start: 2019-06-09 | End: 2019-06-08

## 2019-06-08 RX ORDER — METHYLPREDNISOLONE SODIUM SUCCINATE 125 MG/2ML
80 INJECTION, POWDER, LYOPHILIZED, FOR SOLUTION INTRAMUSCULAR; INTRAVENOUS EVERY 8 HOURS
Status: DISCONTINUED | OUTPATIENT
Start: 2019-06-08 | End: 2019-06-09

## 2019-06-08 RX ORDER — POLYETHYLENE GLYCOL 3350 17 G/17G
17 POWDER, FOR SOLUTION ORAL DAILY PRN
Status: DISCONTINUED | OUTPATIENT
Start: 2019-06-08 | End: 2019-06-11

## 2019-06-08 NOTE — PROGRESS NOTES
[facility]    Progress Note    Tiera García Patient Status:  Inpatient    1945 MRN [id number]   Location [facility] 2W/SW Attending John Thomas MD   Hosp Day # 6 PCP Myles Anton MD        Subjective:     Constituti bipap      3– alcoholism / Dts   Thiamin and folic acid   CIWA protocol     4–frequent falls / profound debility     5– ?  pneumonia  Zosyn to complete 7 days   CXR stable / no large infiltrate or effusion      6–Cor pulmonale   Prn lasix      7– CODE STAT

## 2019-06-08 NOTE — DIETARY NOTE
NUTRITION NOTE UPDATE MILADY COUNT on 6/8:     NUTRITION PRESCRIPTION:  Diet: Pureed, Nectar Thick Liquids and No Straw  Oral Nutrition Supplements (ONS) TID  Estimated Nutrition needs:   Calories: 2000 calories/day (25 calories

## 2019-06-08 NOTE — PROGRESS NOTES
[place] [facility] - [facility]  Hospitalist Progress Note     [facility] Fleeting Patient Status:  Inpatient    1945  69year old [facility] [id number]   Location -A Attending Aidan Thakur MD   Hosp Day # 6 PCP Sondra Holman MD     ASSESSMENT/PLAN    Acu normal, no mass, no HSM, no rebound/guarding. Neuro: Normal reflexes, CN. Sensory/motor exams grossly normal deficit. MS: No joint effusions. No peripheral edema. Skin: Skin is warm and dry. No rashes, erythema, diaphoresis.    Psych: Calm, cooperativ Sulfate, nitroGLYCERIN, LORazepam **OR** LORazepam **OR** LORazepam **OR** LORazepam, ondansetron HCl, Ipratropium Bromide, hydrALAzine HCl    >35min spent, >50% spent counseling and coordinating care in the form of educating pt/family and d/w consultants

## 2019-06-08 NOTE — PLAN OF CARE
Problem: RESPIRATORY - ADULT  Goal: Achieves optimal ventilation and oxygenation  Description  INTERVENTIONS:  - Assess for changes in respiratory status  - Assess for changes in mentation and behavior  - Position to facilitate oxygenation and minimize r that affect risk of falls.   - Wallace fall precautions as indicated by assessment.  - Educate pt/family on patient safety including physical limitations  - Instruct pt to call for assistance with activity based on assessment  - Modify environment to redu patient  Outcome: Progressing     Problem: Delirium  Goal: Minimize duration of delirium  Description  Interventions:  - Encourage use of hearing aids, eye glasses  - Promote highest level of mobility daily  - Provide frequent reorientation  - Promote wake

## 2019-06-08 NOTE — PLAN OF CARE
Pt awake but drowsy this shift. Pt answers simple questions but does not converse otherwise. Family present at bedside. CIWAs q 4hr or as needed and ativan given per protocol. Refuses BiPap; on airvo with increased WOB and pursed lip breathing.   BP nicko injury  - Provide assistive devices as appropriate  - Consider OT/PT consult to assist with strengthening/mobility  - Encourage toileting schedule  Outcome: Progressing     Problem: DISCHARGE PLANNING  Goal: Discharge to home or other facility with appropr and minimize respiratory effort  - Oxygen supplementation based on oxygen saturation or ABGs  - Provide Smoking Cessation handout, if applicable  - Encourage broncho-pulmonary hygiene including cough, deep breathe, Incentive Spirometry  - Assess the need f

## 2019-06-08 NOTE — PROGRESS NOTES
[facility]    Cardiology Progress Note    Fred Downing Patient Status:  Inpatient    1945 MRN [id number]   Location [facility] 2W/SW Attending Neal Jiménez, [address] [facility] Day # 6 PCP Pranay Biggs MD         Assessment an puff Inhalation Daily   • pantoprazole (PROTONIX) IV push  40 mg Intravenous Daily         Results:     Lab Results   Component Value Date    WBC 15.9 (H) 06/08/2019    HGB 10.3 (L) 06/08/2019    HCT 30.6 (L) 06/08/2019    PLT 90.0 (L) 06/08/2019    CREATS

## 2019-06-09 PROCEDURE — 99233 SBSQ HOSP IP/OBS HIGH 50: CPT | Performed by: INTERNAL MEDICINE

## 2019-06-09 PROCEDURE — 99233 SBSQ HOSP IP/OBS HIGH 50: CPT | Performed by: OTHER

## 2019-06-09 PROCEDURE — 99233 SBSQ HOSP IP/OBS HIGH 50: CPT | Performed by: HOSPITALIST

## 2019-06-09 RX ORDER — FOLIC ACID 1 MG/1
1 TABLET ORAL DAILY
Status: DISCONTINUED | OUTPATIENT
Start: 2019-06-09 | End: 2019-06-11

## 2019-06-09 RX ORDER — MULTIPLE VITAMINS W/ MINERALS TAB 9MG-400MCG
1 TAB ORAL DAILY
Status: DISCONTINUED | OUTPATIENT
Start: 2019-06-09 | End: 2019-06-11

## 2019-06-09 RX ORDER — METHYLPREDNISOLONE SODIUM SUCCINATE 125 MG/2ML
60 INJECTION, POWDER, LYOPHILIZED, FOR SOLUTION INTRAMUSCULAR; INTRAVENOUS EVERY 8 HOURS
Status: DISCONTINUED | OUTPATIENT
Start: 2019-06-09 | End: 2019-06-10

## 2019-06-09 RX ORDER — POTASSIUM CHLORIDE 14.9 MG/ML
20 INJECTION INTRAVENOUS ONCE
Status: COMPLETED | OUTPATIENT
Start: 2019-06-09 | End: 2019-06-09

## 2019-06-09 NOTE — PLAN OF CARE
Pt a/o x2-3 this shift. Remains on airvo with sats mid 90s. Pt continues to have pursed lip breathing and becomes SOB with talking, bed mobility, and eating.   Pt unable to tolerate being out of bed this shift after having bath this am d/t extreme fatigu Free from fall injury  Description  INTERVENTIONS:  - Assess pt frequently for physical needs  - Identify cognitive and physical deficits and behaviors that affect risk of falls.   - Courtland fall precautions as indicated by assessment.  - Educate pt/famil for assistance (call light)  - Provide an  as needed  - Communicate barriers and strategies to overcome with those who interact with patient  Outcome: Progressing     Problem: RESPIRATORY - ADULT  Goal: Achieves optimal ventilation and oxygenati

## 2019-06-09 NOTE — PROGRESS NOTES
Pt bathed and offered to use sling to get pt out of bed to chair. He refused at this time as he stated he was just too tired. Will attempt later.

## 2019-06-09 NOTE — PROGRESS NOTES
[place] [facility] - [facility]    Progress Note    Art Lazar Patient Status:  Inpatient    1945 MRN [id number]   Location [facility] 2W/SW Attending Lizzy Otto, [address] [facility] Day # 7 PCP Mariama Patricio MD        Subjective:   Wilmer Slater Results:     Lab Results   Component Value Date    WBC 18.1 (H) 06/09/2019    HGB 11.3 (L) 06/09/2019    HCT 33.7 (L) 06/09/2019    PLT 87.0 (L) 06/09/2019    CREATSERUM 0.91 06/09/2019    BUN 22 (H) 06/09/2019     06/09/2019    K 3.8 06/09/2019

## 2019-06-09 NOTE — PROGRESS NOTES
[facility]    Cardiology Progress Note    Margarita Nasreen Patient Status:  Inpatient    1945 MRN [id number]   Location [facility] 2W/SW Attending Sallee Dance, [address] [facility] Day # 7 PCP Reji Cummings MD         Assessment an Nebulization 6 times per day   • piperacillin-tazobactam  3.375 g Intravenous Q8H   • escitalopram  10 mg Oral QAM   • Fluticasone Furoate-Vilanterol  1 puff Inhalation Daily   • Umeclidinium Bromide  1 puff Inhalation Daily         Results:     Lab Result

## 2019-06-09 NOTE — SLP NOTE
SPEECH DAILY NOTE - INPATIENT    ASSESSMENT & PLAN   ASSESSMENT  Patient alert upon arrival, daughter and spouse at [facility]. Patient's overall respiratory status appears unchanged from previous visit on 6/7/19.   Spouse stating she hopes to take patient \"home bites and sips  Aspiration Precautions: Upright position; Slow rate;Small bites and sips; No straw  Medication Administration Recommendations: One pill at a time;Crushed in puree    Patient Experiencing Pain: No     Discharge Recommendations  Discharge Recom FOLLOW UP  Follow Up Needed: Yes  SLP Follow-up Date: 06/10/19  Number of Visits to Meet Established Goals: 3    Session: 3    If you have any questions, please contact Tremaine Riley M.S., [address] [facility]  Speech-Language Pathologist  May Ferguson

## 2019-06-09 NOTE — PROGRESS NOTES
[place] [facility] - [facility]    Progress Note    Riley Hurt Patient Status:  Inpatient    1945 MRN [id number]   Location [facility] 2W/SW Attending Yusef Morrison, [address] [facility] Day # 7 PCP Abby Zhang MD       Subjective:   Bria Santana Intravenous Q1H PRN   Or      LORazepam (ATIVAN) tab 2 mg 2 mg Oral Q1H PRN   Or      LORazepam (ATIVAN) injection 2 mg 2 mg Intravenous Q1H PRN   ondansetron HCl (ZOFRAN) injection 4 mg 4 mg Intravenous Q6H PRN   INFUVITE ADULT (INFUVITE) 10 mL, Thiamine PLT 90.0 (L) 06/08/2019    .0 (L) 06/07/2019       Recent Labs   Lab 06/07/19  0438 06/08/19  0403 06/09/19  0448   * 199* 181*   BUN 22* 23* 22*   CREATSERUM 0.95 0.99 0.91   GFRAA 91 87 96   GFRNAA 79 75 83   CA 8.2* 8.2* 8.3*    139 6/9/2019

## 2019-06-09 NOTE — PLAN OF CARE
Pt awake and orientated. Pt has been more talkative overnight. CIWAs q 4hr or as needed and ativan given per protocol. Refuses BiPap; on airvo with increased WOB and pursed lip breathing. BP elevated and treated with prn hydralazine - see mar.   Rhythm i pain management  - Manage/alleviate anxiety  - Utilize distraction and/or relaxation techniques  - Monitor for opioid side effects  - Notify MD/LIP if interventions unsuccessful or patient reports new pain  - Anticipate increased pain with activity and pre physician/LIP order or complex needs related to functional status, cognitive ability or social support system  Outcome: Progressing     Problem: Altered Communication/Language Barrier  Goal: Patient/Family is able to understand and participate in their car of home routines  Outcome: Progressing

## 2019-06-09 NOTE — PROGRESS NOTES
[facility] Note: Route Optimization for Multivitamin/Folic acid/Thiamine     Patient is currently on MVI, folic acid 1mg, thiamine 100 mg IV every 24 hours.    The patient meets the criteria to convert to the oral equivalent as established by the IV to Or

## 2019-06-09 NOTE — PROGRESS NOTES
Pt wife at bedside asking when we are \"going to start therapy and get him up walking and doing things\".   Explained to wife the severity of pt's COPD and breathing pattern, pt's difficulty even eating d/t SOB, inability to tolerate much bed mobility witho

## 2019-06-10 ENCOUNTER — APPOINTMENT (OUTPATIENT)
Dept: GENERAL RADIOLOGY | Facility: HOSPITAL | Age: 74
DRG: 190 | End: 2019-06-10
Attending: INTERNAL MEDICINE
Payer: MEDICARE

## 2019-06-10 PROBLEM — Z71.89 ADVANCE CARE PLANNING: Status: ACTIVE | Noted: 2019-06-10

## 2019-06-10 PROBLEM — Z71.89 GOALS OF CARE, COUNSELING/DISCUSSION: Status: ACTIVE | Noted: 2019-06-10

## 2019-06-10 PROCEDURE — 99221 1ST HOSP IP/OBS SF/LOW 40: CPT | Performed by: NURSE PRACTITIONER

## 2019-06-10 PROCEDURE — 99233 SBSQ HOSP IP/OBS HIGH 50: CPT | Performed by: INTERNAL MEDICINE

## 2019-06-10 PROCEDURE — 71045 X-RAY EXAM CHEST 1 VIEW: CPT | Performed by: INTERNAL MEDICINE

## 2019-06-10 PROCEDURE — 99233 SBSQ HOSP IP/OBS HIGH 50: CPT | Performed by: OTHER

## 2019-06-10 PROCEDURE — 99233 SBSQ HOSP IP/OBS HIGH 50: CPT | Performed by: HOSPITALIST

## 2019-06-10 RX ORDER — DILTIAZEM HYDROCHLORIDE 120 MG/1
120 CAPSULE, EXTENDED RELEASE ORAL DAILY
Status: DISCONTINUED | OUTPATIENT
Start: 2019-06-10 | End: 2019-06-11

## 2019-06-10 RX ORDER — TEMAZEPAM 15 MG/1
15 CAPSULE ORAL NIGHTLY PRN
Status: DISCONTINUED | OUTPATIENT
Start: 2019-06-10 | End: 2019-06-11

## 2019-06-10 RX ORDER — METHYLPREDNISOLONE SODIUM SUCCINATE 40 MG/ML
40 INJECTION, POWDER, LYOPHILIZED, FOR SOLUTION INTRAMUSCULAR; INTRAVENOUS EVERY 12 HOURS
Status: DISCONTINUED | OUTPATIENT
Start: 2019-06-10 | End: 2019-06-10

## 2019-06-10 RX ORDER — QUETIAPINE 25 MG/1
37.5 TABLET, FILM COATED ORAL NIGHTLY
Status: DISCONTINUED | OUTPATIENT
Start: 2019-06-10 | End: 2019-06-11

## 2019-06-10 RX ORDER — PREDNISONE 20 MG/1
40 TABLET ORAL
Status: DISCONTINUED | OUTPATIENT
Start: 2019-06-11 | End: 2019-06-11

## 2019-06-10 RX ORDER — IPRATROPIUM BROMIDE AND ALBUTEROL SULFATE 2.5; .5 MG/3ML; MG/3ML
3 SOLUTION RESPIRATORY (INHALATION)
Status: DISCONTINUED | OUTPATIENT
Start: 2019-06-10 | End: 2019-06-11

## 2019-06-10 NOTE — PROGRESS NOTES
[place] [facility] - [facility]    Progress Note    Vadim Ohs Patient Status:  Inpatient    1945 MRN [id number]   Location [facility] 2W/SW Attending Adrienne Fletcher MD   Hosp Day # 8 PCP Jono Dennis MD         Assessment and Plan:   Commun 10 mg Oral QAM   • Fluticasone Furoate-Vilanterol  1 puff Inhalation Daily   • Umeclidinium Bromide  1 puff Inhalation Daily             Results:     Lab Results   Component Value Date    WBC 18.3 (H) 06/10/2019    HGB 11.7 (L) 06/10/2019    HCT 34.4 (L)

## 2019-06-10 NOTE — PLAN OF CARE
Problem: Patient/Family Goals  Goal: Patient/Family Long Term Goal  Description  Patient's Long Term Goal: to go home  Interventions:  - participate in therapies as ordered  - See additional Care Plan goals for specific interventions   Outcome: Progressi anticipated neutropenic period  Description  INTERVENTIONS  - Monitor WBC  - Administer growth factors as ordered  - Implement neutropenic guidelines  Outcome: Progressing     Problem: SAFETY ADULT - FALL  Goal: Free from fall injury  Description  INTERVEN aides and strategies  - Use visual cues when possible  - Listen attentively, be patient, do not interrupt  - Minimize distractions  - Allow time for understanding and response  - Establish method for patient to ask for assistance (call light)  - Provide an

## 2019-06-10 NOTE — PROGRESS NOTES
[facility]    Progress Note    Meg Orf Patient Status:  Inpatient    1945 MRN [id number]   Location [facility] 2W/SW Attending Jackelin Jacobsen MD   Hosp Day # 8 PCP Aditya Vang MD        Subjective:     Constitution STATUS : DNI / partial DNR      8– DVT prophylaxis with heparin subcu     9- profound debility          Overall very poor prognosis  palliative care consult   Pt wants to go home / d/w family                               Results:     Lab Results   Compone

## 2019-06-10 NOTE — CONSULTS
[place] [address] Patient Status:  Inpatient    1945 MRN [id number]   Location [facility] 2W/SW Attending James Delgado MD   Hosp Day # 8 PCP Asia Smart MD     Date of Consult: Airvo at 40 liters. He appears dyspneic and is tachypneic breathing in the low to mid 20's. He answers my questions with brief words or very short sentences. He says, \"I want to go home. \"     Palliative Care Social History: Edmund Lees has been  to [employer] Remydwayne the family meeting.      Medical History:  Past Medical History:   Diagnosis Date   • COPD (chronic obstructive pulmonary disease) ([facility] [address]    • Essential hypertension    • Hepatitis     HEPATITIS C   • Hypoxemia     add O2 QPM 8/12, o/n ox 8/12 sao2 85-96%, > Intravenous, Q1H PRN **OR** LORazepam (ATIVAN) tab 2 mg, 2 mg, Oral, Q1H PRN **OR** LORazepam (ATIVAN) injection 2 mg, 2 mg, Intravenous, Q1H PRN  •  ondansetron HCl (ZOFRAN) injection 4 mg, 4 mg, Intravenous, Q6H PRN  •  Ipratropium Bromide (ATROVENT) 0.0 3. No acute pulmonary disease    Dictated by (CST): Pierre Young MD on 6/10/2019 at 10:15     Approved by (CST): Pierre Young MD on 6/10/2019 at 10:21            Objective:  Vital Signs:  Blood pressure 153/77, pulse 117, temperature 96.9 °F counseling/discussion  -Partial DNR - no intubation/no mechanical ventilation  -Continue supportive medical treatments  -Plan for family meeting tomorrow Tuesday, June 11, 2019 at 11:00 am - full consultation to follow  -Provided emotional support to pt/fa

## 2019-06-10 NOTE — PROGRESS NOTES
[place] [facility] - [facility]    Progress Note    Riley Hurt Patient Status:  Inpatient    1945 MRN [id number]   Location [facility] 2W/SW Attending Yusef Morrison, [address] [facility] Day # 8 PCP Abby Zhang MD       Subjective:   Bria Santana (NITROSTAT) SL tab 0.4 mg 0.4 mg Sublingual Q5 Min PRN   LORazepam (ATIVAN) tab 1 mg 1 mg Oral Q1H PRN   Or      LORazepam (ATIVAN) injection 1 mg 1 mg Intravenous Q1H PRN   Or      LORazepam (ATIVAN) tab 2 mg 2 mg Oral Q1H PRN   Or      LORazepam (ATIVAN) 6/2/2019. 3. No acute pulmonary disease    Dictated by (CST): Lorie Man MD on 6/10/2019 at 10:15     Approved by (CST): Lorie Man MD on 6/10/2019 at 10:21                Results:     CBC:    Lab Results   Component Value Date    WBC 18. improvement.  Cont puree diet for now.      DVT Prophylaxis: scd, consider starting subcu heparin if platelet count rises  CODE status: Partial, DO NOT INTUBATE  Dispo: Keep in PCU      Jocelyn Ricardo, [address]  6/10/2019           Attending Addendum:      Richie Lesch

## 2019-06-10 NOTE — PROGRESS NOTES
Last Juárez is a 69year old   male with a chronic history of COPD on daily oxygen and chronic history of alcoholism with daily drinking who presented to the hospital after recent fall and found with pneumonia.   Alcohol blood concentrat visit):    Current Facility-Administered Medications:  methylPREDNISolone Sodium Succ (Solu-MEDROL) injection 60 mg 60 mg Intravenous Q8H   multivitamin with minerals (ADULT) tab 1 tablet 1 tablet Oral Daily   folic acid (FOLVITE) tab 1 mg 1 mg Oral Daily Q6H PRN       Allergies:  No Known Allergies    Laboratory Data:  Lab Results   Component Value Date    WBC 18.1 (H) 06/09/2019    HGB 11.3 (L) 06/09/2019    HCT 33.7 (L) 06/09/2019    PLT 87.0 (L) 06/09/2019    CREATSERUM 0.91 06/09/2019    BUN 22 (H) 06/ Discontinue Ativan 1 mg 3 times daily. 4.  Continue Seroquel 25 mg nightly  5. Continue Lexapro 10 mg daily. 10.  Educated family about his treatment plan and we agreed.   7.  Follow-up with patient and work with the team    Orders This Visit:  Orders Jp

## 2019-06-10 NOTE — PLAN OF CARE
Problem: PAIN - ADULT  Goal: Verbalizes/displays adequate comfort level or patient's stated pain goal  Description  INTERVENTIONS:  - Encourage pt to monitor pain and request assistance  - Assess pain using appropriate pain scale  - Administer analgesics saturation or ABGs  - Provide Smoking Cessation handout, if applicable  - Encourage broncho-pulmonary hygiene including cough, deep breathe, Incentive Spirometry  - Assess the need for suctioning and perform as needed  - Assess and instruct to report SOB o

## 2019-06-11 VITALS
DIASTOLIC BLOOD PRESSURE: 70 MMHG | HEIGHT: 66 IN | RESPIRATION RATE: 20 BRPM | BODY MASS INDEX: 30.29 KG/M2 | HEART RATE: 91 BPM | OXYGEN SATURATION: 99 % | TEMPERATURE: 98 F | SYSTOLIC BLOOD PRESSURE: 135 MMHG | WEIGHT: 188.5 LBS

## 2019-06-11 PROCEDURE — 99239 HOSP IP/OBS DSCHRG MGMT >30: CPT | Performed by: HOSPITALIST

## 2019-06-11 PROCEDURE — 99233 SBSQ HOSP IP/OBS HIGH 50: CPT | Performed by: NURSE PRACTITIONER

## 2019-06-11 PROCEDURE — 99233 SBSQ HOSP IP/OBS HIGH 50: CPT | Performed by: INTERNAL MEDICINE

## 2019-06-11 RX ORDER — ALBUTEROL SULFATE 2.5 MG/3ML
2.5 SOLUTION RESPIRATORY (INHALATION)
Status: DISCONTINUED | OUTPATIENT
Start: 2019-06-11 | End: 2019-06-11

## 2019-06-11 RX ORDER — POTASSIUM CHLORIDE 14.9 MG/ML
20 INJECTION INTRAVENOUS ONCE
Status: COMPLETED | OUTPATIENT
Start: 2019-06-11 | End: 2019-06-11

## 2019-06-11 NOTE — PLAN OF CARE
Problem: Patient/Family Goals  Goal: Patient/Family Long Term Goal  Description  Patient's Long Term Goal: to go home  Interventions:  - participate in therapies as ordered  - See additional Care Plan goals for specific interventions   Outcome: Progressi anticipated neutropenic period  Description  INTERVENTIONS  - Monitor WBC  - Administer growth factors as ordered  - Implement neutropenic guidelines  Outcome: Progressing     Problem: RESPIRATORY - ADULT  Goal: Achieves optimal ventilation and oxygenation

## 2019-06-11 NOTE — PHYSICAL THERAPY NOTE
PT orders received. Spoke to RN- pt hold this a.m.- palliative meeting. Will re-attempt if appropriate this afternoon.    Thank you,  Eloy Chavarria, PT, DPT

## 2019-06-11 NOTE — DISCHARGE SUMMARY
[facility]    Discharge Summary    Teagan Saunders Patient Status:  Inpatient    1945 MRN [id number]   Location [facility] 4W/SW/SE Attending Dar Espinoza MD   Hosp Day # 9 PCP Chris Navas MD     Date of Admission: respiratory failure. He also has a daily consumption of alcohol, and on the day of admission, he reports 3/4 of a shot earlier in the day and then a half shot of whiskey prior to the fall. He was walking upstairs, lost his balance and fell backwards.   It due to steroids. Repeat CXR pending   - follow cbc in am   -Completed 7 days of Zosyn     Other problems  cor pulmonale  Alcohol abuse  Frequent falls  Depression  Thrombocytopenia  Malnutrition - appec dietary input.  Eating about 50% of meals which is an escitalopram 10 MG Tabs  Commonly known as:  LEXAPRO        Losartan Potassium-HCTZ 50-12.5 MG Tabs  Commonly known as:  HYZAAR        predniSONE 10 MG Tabs  Commonly known as:  Kady                     Discharge Instructions       Home with hospic

## 2019-06-11 NOTE — HOSPICE RN NOTE
[facility] signed consents with family per [facility] at [address] today and our nurse will go to the home and admit at 6pm.  DME was delivered. Medications will be delivered today. POC was discussed with Liz Cesar

## 2019-06-11 NOTE — PLAN OF CARE
Problem: Patient/Family Goals  Goal: Patient/Family Long Term Goal  Description  Patient's Long Term Goal: to go home  Interventions:  - participate in therapies as ordered  - See additional Care Plan goals for specific interventions   6/11/2019 1651 by distraction and/or relaxation techniques  - Monitor for opioid side effects  - Notify MD/LIP if interventions unsuccessful or patient reports new pain  - Anticipate increased pain with activity and pre-medicate as appropriate  6/11/2019 1651 by Therese Wilkins, post-discharge preferences of patient/family/discharge partner  - Complete POLST form as appropriate  - Assess patient's ability to be responsible for managing their own health  - Refer to Case Management Department for coordinating discharge planning if t indicated  - Manage/alleviate anxiety  - Monitor for signs/symptoms of CO2 retention  6/11/2019 1651 by Malathi Dia RN  Outcome: Adequate for Discharge  6/11/2019 1651 by Malathi Dia RN  Outcome: Progressing     Problem: Delirium  Goal: Min

## 2019-06-11 NOTE — PROGRESS NOTES
[facility]    Progress Note    Tiera García Patient Status:  Inpatient    1945 MRN [id number]   Location [facility] 2W/SW Attending Wallace Donaldson MD   Hosp Day # 9 PCP Myles Anton MD        Subjective:     Zara Ham Component Value Date    WBC 16.6 (H) 06/11/2019    HGB 10.6 (L) 06/11/2019    HCT 31.5 (L) 06/11/2019    PLT 59.0 (L) 06/11/2019    CREATSERUM 0.81 06/11/2019    BUN 24 (H) 06/11/2019     06/11/2019    K 3.7 06/11/2019     06/11/2019    CO2 3

## 2019-06-11 NOTE — OCCUPATIONAL THERAPY NOTE
OT orders rcvd; contacted RN Matthew Main to ascertain if patient appropriate for OT evaluation; RN requesting therapy HOLD today- per RN, patient is tolerating minimal activities at this time and demand for breathing increases with functional task; planned palli

## 2019-06-11 NOTE — PALLIATIVE CARE NOTE
[place] [facility] - [facility]  Palliative Care Follow Up    [place] Patient Status:  Inpatient    1945 MRN [id number]   Location [facility] 2W/SW Attending Lynn Baez MD   Hosp Day # 9 PCP Live Birch MD     Date of Consult: There is no HCPOA paperwork. However, Eliza Chamberlain wants her daughters to make the decisions regarding Chandana's medical condition including deferring to them a DNR status.     Daughters verbalized wishes for a full DNR - DNR/DNI, Comfort-Focused Treatment, and n Nightly PRN  •  QUEtiapine Fumarate (SEROQUEL) tab 37.5 mg, 37.5 mg, Oral, Nightly  •  multivitamin with minerals (ADULT) tab 1 tablet, 1 tablet, Oral, Daily  •  folic acid (FOLVITE) tab 1 mg, 1 mg, Oral, Daily  •  PEG 3350 (MIRALAX) powder packet 17 g, 17 BUN 24 (H) 06/11/2019     06/11/2019    K 3.7 06/11/2019     06/11/2019    CO2 38.0 (H) 06/11/2019     (H) 06/11/2019    CA 8.5 06/11/2019    ALB 2.3 (L) 06/05/2019    ALKPHO 124 (H) 06/05/2019    BILT 1.1 06/05/2019    TP 6.1 (L) 06/05/ counseling/discussion  -Order for DNR/DNI  -Family to meet with [facility] today at 1:00 pm; their goal is to get pt home ASAP with home hospice services  -Provided emotional support to pt/family who was tearful at times but seem to be coping adeq

## 2019-06-11 NOTE — CM/SW NOTE
Received MDO for hospice. Referral made to [facility], order sent through [facility]. Likely meeting planned today w/ family.     Tanja Zarate, [address]

## 2019-06-11 NOTE — PLAN OF CARE
Double RN skin check done prior to transfer off Unit. Skin check performed by this RN and carito k  Wounds are as follows: bilateral skin tears on elbows, generalized bruising over entire body  Will remain available for any further questions or concerns.

## 2019-06-11 NOTE — CM/SW NOTE
MSW met with family to sign hospice consents for [facility] at home today , 6/11/19. DME has been ordered.    BRAYAN Arzola  [facility]  [phone]

## 2019-06-11 NOTE — PROGRESS NOTES
[facility]    Progress Note    Tiera García Patient Status:  Inpatient    1945 MRN [id number]   Location [facility] 2W/SW Attending Wallace Donaldson MD   Hosp Day # 9 PCP Myles Anton MD         Assessment and Plan: • folic acid  1 mg Oral Daily   • omeprazole  20 mg Oral Daily   • Thiamine HCl  100 mg Oral Daily   • escitalopram  10 mg Oral QAM   • Fluticasone Furoate-Vilanterol  1 puff Inhalation Daily   • Umeclidinium Bromide  1 puff Inhalation Daily

## 2019-06-11 NOTE — PROGRESS NOTES
Riley Hurt is a 69year old   male with a chronic history of COPD on daily oxygen and chronic history of alcoholism with daily drinking who presented to the hospital after recent fall and found with pneumonia.   Alcohol blood concentrat 11.4      Smokeless tobacco: Never Used    Alcohol use: No      Alcohol/week: 0.0 oz    Drug use: No       Medications (Active prior to today's visit):    Current Facility-Administered Medications:  diltiazem (CARDIZEM CD) 24 hr cap 120 mg 120 mg Oral Maurice injection 10 mg 10 mg Intravenous Q6H PRN       Allergies:  No Known Allergies    Laboratory Data:  Lab Results   Component Value Date    WBC 18.3 (H) 06/10/2019    HGB 11.7 (L) 06/10/2019    HCT 34.4 (L) 06/10/2019    PLT 85.0 (L) 06/10/2019    CREATSERUM and insight are improving       ASSESSMENT/PLAN:      Alcohol withdrawal with delirium.   Major depressive disorder single episode severe without psychotic features  Alcohol dependency, continuous     Discussed risk and benefit, acknowledging the current sy Once      Scan slide      Basic Metabolic Panel (8)      CBC With Differential With Platelet      Magnesium      Basic Metabolic Panel (8)      CBC With Differential With Platelet      Scan slide      CBC With Differential With Platelet      Basic Metaboli

## 2019-06-11 NOTE — PLAN OF CARE
Patient remains on Airvo 40L at 30%. Patient was anxious early yesterday evening, ativan given per family request. Patient has been very calm overnight and slept 7 hours. No complaints. Patient turned q 2 hours.  Patient has been trying to refuse meds but w and pain management  - Manage/alleviate anxiety  - Utilize distraction and/or relaxation techniques  - Monitor for opioid side effects  - Notify MD/LIP if interventions unsuccessful or patient reports new pain  - Anticipate increased pain with activity and on physician/LIP order or complex needs related to functional status, cognitive ability or social support system  Outcome: Progressing     Problem: Altered Communication/Language Barrier  Goal: Patient/Family is able to understand and participate in their promotion of home routines  Outcome: Progressing

## 2021-03-09 DIAGNOSIS — Z23 NEED FOR VACCINATION: ICD-10-CM

## 2022-03-09 NOTE — PROGRESS NOTES
Rapid response note    Rapid response called to the patient's room for respiratory distress. Patient just admitted from the ED for COPD flare, fall and chest wall injury.   Patient clearly dyspneic with diffuse expiratory wheezing on my arrival to the room Detail Level: Generalized Initiate Treatment: Ketoconazole 2% shampoo \\nKetoconazole 2% cream

## 2022-05-20 NOTE — TELEPHONE ENCOUNTER
RN is trying to schedule pt for testing , rehab - he is non compliant - asking to talk to [facility] Smoking cessation discussed.

## (undated) NOTE — LETTER
Hospital Discharge Documentation  Please phone to schedule a hospital follow up appointment.     From: [address] Hospitalist's Office  Phone: [phone]    Patient discharged time/date: 5/21/2017  5:40 PM  Patient discharge disposition:  Home or Self Cardiovascular: S1, S2. Regular rate and rhythm. No murmurs, rubs or gallops. Abdomen: Soft, nontender, nondistended. Positive bowel sounds. No rebound or guarding. Neurologic: No focal neurological deficits. Musculoskeletal: Moves all extremities. Take 1 tablet (750 mg total) by mouth daily. folic acid 1 MG Oral Tab  Take 1 tablet (1 mg total) by mouth daily. Thiamine HCl 100 MG Oral Tab  Take 1 tablet (100 mg total) by mouth daily.       Home Meds - Unchanged    Losartan Potassium 25 MG Oral T Losartan Potassium 25 MG Tabs   Last time this was given:  12.5 mg on 5/21/2017  8:47 AM   Commonly known as:  COZAAR      Take 12.5 mg by mouth daily.     Refills:  0       PROAIR  (90 Base) MCG/ACT Aers   Generic drug:  Albuterol Sulfate HFA

## (undated) NOTE — MR AVS SNAPSHOT
[facility]  [address] [place] [address], [address] [place] [address]  [phone]               Thank you for choosing us for your health care visit with Chris Spicer.  Shira Maxwell MD.  We are glad to serve you and happy to provide you with this summary of Losartan Potassium-HCTZ 50-12.5 MG Tabs   Take by mouth. Commonly known as:  HYZAAR           nystatin 032893 UNIT/ML Susp   Take 5 mL (500,000 Units total) by mouth 4 (four) times daily.    Commonly known as:  MYCOSTATIN           predniSONE 10 MG Tabs Sign up for [employer], your secure online medical record. Kelan will allow you to access patient instructions from your recent visit,  view other health information, and more. To sign up or find more information, go to [email or web address]

## (undated) NOTE — IP AVS SNAPSHOT
[address]  [address] [facility] [phone]                Discharge Summary   5/17/2017    Simeon Martin           Admission Information        Provider Department    5/17/2017 Adam Suggs MD [facility] 3w/Sw Take 1 tablet (1 mg total) by mouth daily. Adrianne Wright                           levofloxacin 750 MG Tabs   Commonly known as:  LEVAQUIN   Start taking on:  5/22/2017   Next dose due:  05/22          Take 1 tablet (750 mg total) by mouth daily.     H Take 10mg on 5/30 and 5/31    Librium taper instructions  Take on tablet in the evening on 5/21, 5/22 and 5/23    Discharge References/Attachments     ASTHMA, MEDICINE (ENGLISH)    CHRONIC OBSTRUCTIVE PULMONARY DISEASE (COPD), DISCHARGE INSTRUCTIONS (ENGLI 13.3 (H) (05/21/17)  0.5 (L) (05/21/17)  0.9 (05/21/17)  0.0 (05/21/17)  0.0    (05/20/17)  93 (05/20/17)  2 (05/20/17)  5 (05/20/17)  0 (05/20/17)  0  (05/20/17)  18.9 (H) (05/20/17)  0.4 (L) (05/20/17)  1.0 (05/20/17)  0.0 (05/20/17)  0.0    (05/19/17) Medicaid plans. To get signed up and covered, please call [phone] and ask to get set up for an insurance coverage that is in-network with [facility] Perdom[facility]. Lorenza     Sign up for [employer], your secure online medical record.   [employer] wi Blood Pressure and Cardiac Medications     Losartan Potassium 25 MG Oral Tab         Use: Treat abnormal blood pressure (high or low), cardiac conditions; and/or abnormal heart rates/rhythms   Most common side effects: Dizziness or feeling lightheade Most common side effects: Dizziness, drowsiness, problems with movement   What to report to your healthcare team: Changes in thinking, talking or movement, drowsiness, shaking           Calming and Sleep Medications     ChlordiazePOXIDE HCl 5 MG Oral Cap

## (undated) NOTE — LETTER
[address] [facility] [address]     I agree to have a Peripherally Inserted Central Catheter (PICC) placed in my arm.      1. The PICC insertion procedure, care, maintenance, risks, benefits, and complications Statement of Physician: My signature below affirms that prior to the time of the PICC line insertion, I have explained to the patient and/or his/her legal representative, the risks and benefits involved in the proposed treatment and any reasonable alternat

## (undated) NOTE — LETTER
Hospital Discharge Documentation  Pt was discharged home with [facility].      From: [address] Hospitalist's Office  Phone: [phone]    Patient discharged time/date: 6/11/2019  5:09 PM  Patient discharge disposition:  Hospice/Home Advance care planning    Physical Exam:General appearance: alert, appears stated age and cooperative  Pulmonary:  No wheezing, slight use of accessory muscles    Cardiovascular: S1, S2 normal, no murmur, click, rub or gallop, regular rate and rhythm  Ab will d/c home without steroids   -Pulmonology following  -Continue supplemental oxygen- now on 5L  -Nebulizer treatments     Acute COPD exacerbation.  End-stage.  Chronically dependent on 4 L of oxygen.  Major contributor to his hypoxemic respiratory failu USE ONE VIAL VIA NEBULIZER EVERY 6 HOURS AS NEEDED FOR WHEEZING   Refills:  3     PROAIR  (90 Base) MCG/ACT Aers  Generic drug:  Albuterol Sulfate HFA      Inhale 2 puffs into the lungs every 6 (six) hours as needed for Wheezing or Shortness of Minnie